# Patient Record
Sex: FEMALE | Race: ASIAN | NOT HISPANIC OR LATINO | Employment: UNEMPLOYED | URBAN - METROPOLITAN AREA
[De-identification: names, ages, dates, MRNs, and addresses within clinical notes are randomized per-mention and may not be internally consistent; named-entity substitution may affect disease eponyms.]

---

## 2020-08-03 ENCOUNTER — LAB (OUTPATIENT)
Dept: LAB | Facility: HOSPITAL | Age: 30
End: 2020-08-03

## 2020-08-03 ENCOUNTER — OFFICE VISIT (OUTPATIENT)
Dept: INTERNAL MEDICINE | Facility: CLINIC | Age: 30
End: 2020-08-03

## 2020-08-03 VITALS
BODY MASS INDEX: 31.02 KG/M2 | SYSTOLIC BLOOD PRESSURE: 108 MMHG | HEART RATE: 73 BPM | HEIGHT: 63 IN | DIASTOLIC BLOOD PRESSURE: 76 MMHG | WEIGHT: 175.1 LBS | OXYGEN SATURATION: 96 % | TEMPERATURE: 97.5 F

## 2020-08-03 DIAGNOSIS — E03.9 ACQUIRED HYPOTHYROIDISM: ICD-10-CM

## 2020-08-03 DIAGNOSIS — E66.09 CLASS 1 OBESITY DUE TO EXCESS CALORIES WITHOUT SERIOUS COMORBIDITY WITH BODY MASS INDEX (BMI) OF 31.0 TO 31.9 IN ADULT: ICD-10-CM

## 2020-08-03 DIAGNOSIS — Z00.00 HEALTHCARE MAINTENANCE: Primary | ICD-10-CM

## 2020-08-03 DIAGNOSIS — D69.6 THROMBOCYTOPENIA (HCC): ICD-10-CM

## 2020-08-03 PROBLEM — E66.811 CLASS 1 OBESITY DUE TO EXCESS CALORIES WITHOUT SERIOUS COMORBIDITY WITH BODY MASS INDEX (BMI) OF 31.0 TO 31.9 IN ADULT: Status: ACTIVE | Noted: 2020-08-03

## 2020-08-03 LAB
ALBUMIN SERPL-MCNC: 4.8 G/DL (ref 3.5–5.2)
ALBUMIN/GLOB SERPL: 2.2 G/DL
ALP SERPL-CCNC: 57 U/L (ref 39–117)
ALT SERPL W P-5'-P-CCNC: 34 U/L (ref 1–33)
ANION GAP SERPL CALCULATED.3IONS-SCNC: 10.7 MMOL/L (ref 5–15)
AST SERPL-CCNC: 22 U/L (ref 1–32)
BILIRUB SERPL-MCNC: 0.5 MG/DL (ref 0–1.2)
BUN SERPL-MCNC: 10 MG/DL (ref 6–20)
BUN/CREAT SERPL: 16.7 (ref 7–25)
CALCIUM SPEC-SCNC: 9.2 MG/DL (ref 8.6–10.5)
CHLORIDE SERPL-SCNC: 101 MMOL/L (ref 98–107)
CO2 SERPL-SCNC: 25.3 MMOL/L (ref 22–29)
CREAT SERPL-MCNC: 0.6 MG/DL (ref 0.57–1)
DEPRECATED RDW RBC AUTO: 42.3 FL (ref 37–54)
ERYTHROCYTE [DISTWIDTH] IN BLOOD BY AUTOMATED COUNT: 13 % (ref 12.3–15.4)
GFR SERPL CREATININE-BSD FRML MDRD: 116 ML/MIN/1.73
GFR SERPL CREATININE-BSD FRML MDRD: 140 ML/MIN/1.73
GLOBULIN UR ELPH-MCNC: 2.2 GM/DL
GLUCOSE SERPL-MCNC: 116 MG/DL (ref 65–99)
HCT VFR BLD AUTO: 40.8 % (ref 34–46.6)
HGB BLD-MCNC: 12.8 G/DL (ref 12–15.9)
LYMPHOCYTES # BLD MANUAL: 1.46 10*3/MM3 (ref 0.7–3.1)
LYMPHOCYTES NFR BLD MANUAL: 29 % (ref 19.6–45.3)
LYMPHOCYTES NFR BLD MANUAL: 5 % (ref 5–12)
MCH RBC QN AUTO: 27.9 PG (ref 26.6–33)
MCHC RBC AUTO-ENTMCNC: 31.4 G/DL (ref 31.5–35.7)
MCV RBC AUTO: 88.9 FL (ref 79–97)
MONOCYTES # BLD AUTO: 0.25 10*3/MM3 (ref 0.1–0.9)
NEUTROPHILS # BLD AUTO: 3.32 10*3/MM3 (ref 1.7–7)
NEUTROPHILS NFR BLD MANUAL: 66 % (ref 42.7–76)
PLAT MORPH BLD: NORMAL
PLATELET # BLD AUTO: 99 10*3/MM3 (ref 140–450)
POTASSIUM SERPL-SCNC: 3.6 MMOL/L (ref 3.5–5.2)
PROT SERPL-MCNC: 7 G/DL (ref 6–8.5)
RBC # BLD AUTO: 4.59 10*6/MM3 (ref 3.77–5.28)
RBC MORPH BLD: NORMAL
SODIUM SERPL-SCNC: 137 MMOL/L (ref 136–145)
T4 FREE SERPL-MCNC: 1.46 NG/DL (ref 0.93–1.7)
TSH SERPL DL<=0.05 MIU/L-ACNC: 2.89 UIU/ML (ref 0.27–4.2)
WBC # BLD AUTO: 5.03 10*3/MM3 (ref 3.4–10.8)
WBC MORPH BLD: NORMAL

## 2020-08-03 PROCEDURE — 85025 COMPLETE CBC W/AUTO DIFF WBC: CPT | Performed by: FAMILY MEDICINE

## 2020-08-03 PROCEDURE — 80053 COMPREHEN METABOLIC PANEL: CPT | Performed by: FAMILY MEDICINE

## 2020-08-03 PROCEDURE — 99385 PREV VISIT NEW AGE 18-39: CPT | Performed by: FAMILY MEDICINE

## 2020-08-03 PROCEDURE — 84439 ASSAY OF FREE THYROXINE: CPT | Performed by: FAMILY MEDICINE

## 2020-08-03 PROCEDURE — 36415 COLL VENOUS BLD VENIPUNCTURE: CPT | Performed by: FAMILY MEDICINE

## 2020-08-03 PROCEDURE — 85007 BL SMEAR W/DIFF WBC COUNT: CPT | Performed by: FAMILY MEDICINE

## 2020-08-03 PROCEDURE — 84443 ASSAY THYROID STIM HORMONE: CPT | Performed by: FAMILY MEDICINE

## 2020-08-03 PROCEDURE — 99214 OFFICE O/P EST MOD 30 MIN: CPT | Performed by: FAMILY MEDICINE

## 2020-08-03 RX ORDER — LEVOTHYROXINE SODIUM 0.05 MG/1
50 TABLET ORAL DAILY
COMMUNITY
End: 2020-08-06 | Stop reason: SDUPTHER

## 2020-08-03 RX ORDER — ACETAMINOPHEN AND CODEINE PHOSPHATE 120; 12 MG/5ML; MG/5ML
0.35 SOLUTION ORAL DAILY
COMMUNITY
Start: 2020-01-14 | End: 2021-01-13

## 2020-08-03 NOTE — PROGRESS NOTES
Subjective   Kathleen Claire is a 30 y.o. female.     Chief Complaint   Patient presents with   • new patient visit   • Hypothyroidism     would like referral to endo         History of Present Illness   Kathleen Claire 30 y.o. female who presents for an Annual Wellness Visit.  she has a history of   Patient Active Problem List   Diagnosis   • Acquired hypothyroidism   • Healthcare maintenance   • Class 1 obesity due to excess calories without serious comorbidity with body mass index (BMI) of 31.0 to 31.9 in adult   .  she has been feeling fairly well.  Labs results discussed in detail with the patient.  Plan to update vaccines if needed today.  I  reviewed health maintenance with her as part of my preventative care plan.    Health Habits:  Dental Exam. up to date  Eye Exam. due  Exercise: 5 times/week.  Current exercise activities include: housecleaning and walking      The following portions of the patient's history were reviewed and updated as appropriate: allergies, current medications, past family history, past medical history, past social history, past surgical history and problem list.    Review of Systems   Constitutional: Negative.    HENT: Positive for sinus pressure.    Eyes: Negative.    Respiratory: Negative.    Cardiovascular: Negative.    Gastrointestinal: Negative.    Genitourinary: Negative.    Musculoskeletal: Negative.    Allergic/Immunologic: Negative.    Neurological: Positive for headaches.   Psychiatric/Behavioral: Negative for confusion and sleep disturbance. The patient is not nervous/anxious.        Objective   Physical Exam   Constitutional: She is oriented to person, place, and time. She appears well-developed and well-nourished.   HENT:   Head: Normocephalic and atraumatic.   Right Ear: External ear normal.   Left Ear: External ear normal.   Eyes: Pupils are equal, round, and reactive to light. EOM are normal. No scleral icterus.   Neck: Normal range of motion. Neck supple. No JVD present.  No thyromegaly present.   Cardiovascular: Normal rate and regular rhythm.   Pulmonary/Chest: Effort normal and breath sounds normal.   Abdominal: There is no tenderness.   Musculoskeletal: She exhibits no edema or tenderness.   Neurological: She is alert and oriented to person, place, and time. She displays normal reflexes. No cranial nerve deficit or sensory deficit. She exhibits normal muscle tone. Coordination normal.   Skin: Skin is warm and dry.   Psychiatric: She has a normal mood and affect. Her behavior is normal. Judgment and thought content normal.   Nursing note and vitals reviewed.      Assessment/Plan   Kathleen was seen today for new patient visit and hypothyroidism.    Diagnoses and all orders for this visit:    Healthcare maintenance  -     CBC & Differential  -     Comprehensive Metabolic Panel    Acquired hypothyroidism  -     TSH  -     T4, Free    Continue attempts at healthy lifestyle with calorie appropriate diet regular physical activity she will obtain Pap smear through gynecology

## 2020-08-03 NOTE — PROGRESS NOTES
Kathleen Claire is a 30 y.o. female.      Assessment/Plan   Problem List Items Addressed This Visit        Digestive    Class 1 obesity due to excess calories without serious comorbidity with body mass index (BMI) of 31.0 to 31.9 in adult       Endocrine    Acquired hypothyroidism    Relevant Medications    levothyroxine (SYNTHROID, LEVOTHROID) 50 MCG tablet    Other Relevant Orders    TSH    T4, Free       Hematopoietic and Hemostatic    Thrombocytopenia (CMS/HCC)    Overview     Peripartum            Other    Healthcare maintenance - Primary    Relevant Orders    CBC & Differential    Comprehensive Metabolic Panel    CBC Auto Differential             Return in about 6 months (around 2/3/2021), or if symptoms worsen or fail to improve, for Recheck, Next scheduled follow up.      Chief Complaint   Patient presents with   • new patient visit   • Hypothyroidism     would like referral to endo     Social History     Tobacco Use   • Smoking status: Never Smoker   • Smokeless tobacco: Never Used   Substance Use Topics   • Alcohol use: Never     Frequency: Never   • Drug use: Never       History of Present Illness   New patient visit for follow-up of chronic problems of hypothyroidism since  she has some headache that is developed over the last few days she also had episode of low platelets with  2019 subsequent no problems no follow-up labs performed.  Concerned that her thyroid necessitates consultation with endocrinologist  Her headache is mostly in the occiput to superior trapezius muscle distribution on the left compared to the right no chest pain or shortness of breath some head congestion no specific allergies this time of year.  She is very been very busy with family in town and has not had follow-up laboratory performed since  has no abdominal complaints or concerns  The following portions of the patient's history were reviewed and updated as appropriate:PMHroutine: Social history ,  "Allergies, Current Medications, Active Problem List and Health Maintenance    Review of Systems   Constitutional: Negative.    HENT: Negative.    Respiratory: Negative.    Cardiovascular: Negative.    Hematological: Negative.        Objective   Vitals:    08/03/20 0913   BP: 108/76   BP Location: Right arm   Patient Position: Sitting   Cuff Size: Large Adult   Pulse: 73   Temp: 97.5 °F (36.4 °C)   TempSrc: Temporal   SpO2: 96%   Weight: 79.4 kg (175 lb 1.6 oz)   Height: 160 cm (63\")     Body mass index is 31.02 kg/m².  Physical Exam   Constitutional: She appears well-developed and well-nourished.   HENT:   Head: Normocephalic and atraumatic.   Right Ear: External ear normal.   Left Ear: External ear normal.   Eyes: EOM are normal. No scleral icterus.   Neck: No thyromegaly present.   Cardiovascular: Normal rate and regular rhythm.   Pulmonary/Chest: Effort normal and breath sounds normal.   Psychiatric: She has a normal mood and affect. Her behavior is normal. Judgment and thought content normal.   Nursing note and vitals reviewed.    Reviewed Data:  No visits with results within 1 Month(s) from this visit.   Latest known visit with results is:   No results found for any previous visit.     "

## 2020-08-05 DIAGNOSIS — D69.6 DECREASED PLATELET COUNT (HCC): Primary | ICD-10-CM

## 2020-08-06 RX ORDER — LEVOTHYROXINE SODIUM 0.05 MG/1
50 TABLET ORAL DAILY
Qty: 90 TABLET | Refills: 2 | Status: SHIPPED | OUTPATIENT
Start: 2020-08-06 | End: 2021-05-25

## 2020-08-27 ENCOUNTER — APPOINTMENT (OUTPATIENT)
Dept: OTHER | Facility: HOSPITAL | Age: 30
End: 2020-08-27

## 2020-09-17 ENCOUNTER — LAB (OUTPATIENT)
Dept: OTHER | Facility: HOSPITAL | Age: 30
End: 2020-09-17

## 2020-09-17 ENCOUNTER — CONSULT (OUTPATIENT)
Dept: ONCOLOGY | Facility: CLINIC | Age: 30
End: 2020-09-17

## 2020-09-17 VITALS
DIASTOLIC BLOOD PRESSURE: 71 MMHG | RESPIRATION RATE: 16 BRPM | HEIGHT: 63 IN | WEIGHT: 174.2 LBS | HEART RATE: 92 BPM | SYSTOLIC BLOOD PRESSURE: 132 MMHG | TEMPERATURE: 97.6 F | BODY MASS INDEX: 30.87 KG/M2 | OXYGEN SATURATION: 100 %

## 2020-09-17 DIAGNOSIS — D69.6 THROMBOCYTOPENIA (HCC): Primary | ICD-10-CM

## 2020-09-17 DIAGNOSIS — D69.6 THROMBOCYTOPENIA (HCC): ICD-10-CM

## 2020-09-17 DIAGNOSIS — R89.9 ABNORMAL LABORATORY TEST: Primary | ICD-10-CM

## 2020-09-17 LAB
ALBUMIN SERPL-MCNC: 4.7 G/DL (ref 3.5–5.2)
ALBUMIN/GLOB SERPL: 1.7 G/DL
ALP SERPL-CCNC: 67 U/L (ref 39–117)
ALT SERPL W P-5'-P-CCNC: 23 U/L (ref 1–33)
ANION GAP SERPL CALCULATED.3IONS-SCNC: 7.6 MMOL/L (ref 5–15)
APTT PPP: 29.3 SECONDS (ref 22.7–35.4)
AST SERPL-CCNC: 20 U/L (ref 1–32)
BASOPHILS # BLD AUTO: 0.04 10*3/MM3 (ref 0–0.2)
BASOPHILS NFR BLD AUTO: 0.7 % (ref 0–1.5)
BILIRUB SERPL-MCNC: 0.3 MG/DL (ref 0–1.2)
BUN SERPL-MCNC: 10 MG/DL (ref 6–20)
BUN/CREAT SERPL: 20 (ref 7–25)
CALCIUM SPEC-SCNC: 9.2 MG/DL (ref 8.6–10.5)
CHLORIDE SERPL-SCNC: 107 MMOL/L (ref 98–107)
CO2 SERPL-SCNC: 25.4 MMOL/L (ref 22–29)
CREAT SERPL-MCNC: 0.5 MG/DL (ref 0.57–1)
DEPRECATED RDW RBC AUTO: 38.3 FL (ref 37–54)
EOSINOPHIL # BLD AUTO: 0.09 10*3/MM3 (ref 0–0.4)
EOSINOPHIL NFR BLD AUTO: 1.5 % (ref 0.3–6.2)
ERYTHROCYTE [DISTWIDTH] IN BLOOD BY AUTOMATED COUNT: 12.6 % (ref 12.3–15.4)
FERRITIN SERPL-MCNC: 39.2 NG/ML (ref 13–150)
FIBRINOGEN PPP-MCNC: 259 MG/DL (ref 219–464)
FOLATE SERPL-MCNC: 13.5 NG/ML (ref 4.78–24.2)
GFR SERPL CREATININE-BSD FRML MDRD: 143 ML/MIN/1.73
GFR SERPL CREATININE-BSD FRML MDRD: >150 ML/MIN/1.73
GLOBULIN UR ELPH-MCNC: 2.7 GM/DL
GLUCOSE SERPL-MCNC: 111 MG/DL (ref 65–99)
HAV IGM SERPL QL IA: NORMAL
HBV CORE IGM SERPL QL IA: NORMAL
HBV SURFACE AG SERPL QL IA: NORMAL
HCT VFR BLD AUTO: 39 % (ref 34–46.6)
HCV AB SER DONR QL: NORMAL
HGB BLD-MCNC: 13.1 G/DL (ref 12–15.9)
IMM GRANULOCYTES # BLD AUTO: 0.03 10*3/MM3 (ref 0–0.05)
IMM GRANULOCYTES NFR BLD AUTO: 0.5 % (ref 0–0.5)
INR PPP: 0.95 (ref 0.9–1.1)
IRON 24H UR-MRATE: 77 MCG/DL (ref 37–145)
IRON SATN MFR SERPL: 19 % (ref 20–50)
LYMPHOCYTES # BLD AUTO: 2.29 10*3/MM3 (ref 0.7–3.1)
LYMPHOCYTES NFR BLD AUTO: 37.6 % (ref 19.6–45.3)
MCH RBC QN AUTO: 28.6 PG (ref 26.6–33)
MCHC RBC AUTO-ENTMCNC: 33.6 G/DL (ref 31.5–35.7)
MCV RBC AUTO: 85.2 FL (ref 79–97)
MONOCYTES # BLD AUTO: 0.37 10*3/MM3 (ref 0.1–0.9)
MONOCYTES NFR BLD AUTO: 6.1 % (ref 5–12)
NEUTROPHILS NFR BLD AUTO: 3.27 10*3/MM3 (ref 1.7–7)
NEUTROPHILS NFR BLD AUTO: 53.6 % (ref 42.7–76)
NRBC BLD AUTO-RTO: 0 /100 WBC (ref 0–0.2)
PLATELET # BLD AUTO: 115 10*3/MM3 (ref 140–450)
PLATELET # BLD AUTO: 95 10*3/MM3 (ref 140–450)
PLATELETS.RETICULATED NFR BLD AUTO: 28.9 % (ref 0.9–6.5)
PMV BLD AUTO: 14.6 FL (ref 6–12)
POTASSIUM SERPL-SCNC: 3.7 MMOL/L (ref 3.5–5.2)
PROT SERPL-MCNC: 7.4 G/DL (ref 6–8.5)
PROTHROMBIN TIME: 12.6 SECONDS (ref 11.7–14.2)
RBC # BLD AUTO: 4.58 10*6/MM3 (ref 3.77–5.28)
SODIUM SERPL-SCNC: 140 MMOL/L (ref 136–145)
TIBC SERPL-MCNC: 416 MCG/DL (ref 298–536)
TRANSFERRIN SERPL-MCNC: 279 MG/DL (ref 200–360)
VIT B12 BLD-MCNC: 217 PG/ML (ref 211–946)
WBC # BLD AUTO: 6.09 10*3/MM3 (ref 3.4–10.8)

## 2020-09-17 PROCEDURE — 80074 ACUTE HEPATITIS PANEL: CPT | Performed by: INTERNAL MEDICINE

## 2020-09-17 PROCEDURE — 83540 ASSAY OF IRON: CPT | Performed by: INTERNAL MEDICINE

## 2020-09-17 PROCEDURE — 85055 RETICULATED PLATELET ASSAY: CPT | Performed by: INTERNAL MEDICINE

## 2020-09-17 PROCEDURE — 99204 OFFICE O/P NEW MOD 45 MIN: CPT | Performed by: INTERNAL MEDICINE

## 2020-09-17 PROCEDURE — 80053 COMPREHEN METABOLIC PANEL: CPT | Performed by: INTERNAL MEDICINE

## 2020-09-17 PROCEDURE — 36415 COLL VENOUS BLD VENIPUNCTURE: CPT

## 2020-09-17 PROCEDURE — 85610 PROTHROMBIN TIME: CPT | Performed by: INTERNAL MEDICINE

## 2020-09-17 PROCEDURE — 84466 ASSAY OF TRANSFERRIN: CPT | Performed by: INTERNAL MEDICINE

## 2020-09-17 PROCEDURE — 87389 HIV-1 AG W/HIV-1&-2 AB AG IA: CPT | Performed by: INTERNAL MEDICINE

## 2020-09-17 PROCEDURE — 85384 FIBRINOGEN ACTIVITY: CPT | Performed by: INTERNAL MEDICINE

## 2020-09-17 PROCEDURE — 85025 COMPLETE CBC W/AUTO DIFF WBC: CPT | Performed by: INTERNAL MEDICINE

## 2020-09-17 PROCEDURE — 82728 ASSAY OF FERRITIN: CPT | Performed by: INTERNAL MEDICINE

## 2020-09-17 PROCEDURE — 82746 ASSAY OF FOLIC ACID SERUM: CPT | Performed by: INTERNAL MEDICINE

## 2020-09-17 PROCEDURE — 82607 VITAMIN B-12: CPT | Performed by: INTERNAL MEDICINE

## 2020-09-17 PROCEDURE — 85730 THROMBOPLASTIN TIME PARTIAL: CPT | Performed by: INTERNAL MEDICINE

## 2020-09-17 PROCEDURE — 86703 HIV-1/HIV-2 1 RESULT ANTBDY: CPT | Performed by: INTERNAL MEDICINE

## 2020-09-17 NOTE — PROGRESS NOTES
Subjective   Kathleen Claire is a 30 y.o. female.  Referred by  for evaluation of thrombocytopenia.      History of Present Illness    is a 30-year-old premenopausal  lady with longstanding history of thrombocytopenia.  She was diagnosed with thrombocytopenia prior to having her first child about 8 years ago.  She reports being placed on steroids recently around the time of delivery of her second child about 9 months ago.  At that point she was evaluated by Dr. Nehemias Carmen at [facility] who recommended steroids to help increase the platelet count.  100,000 to facilitate epidural.  She moved to [place] about a year ago from [place] where she reportedly had seen a hematologist and was diagnosed with ITP.  She denies being on any medications chronically except for levothyroxine, denies any new herbal medications or herbal teas.  On review of the labs she does have B12 deficiency with a B12 level at 100.  She has been on oral B12 replacement however she is has not taken that since she got pregnant with her second child.  She has never been on parenteral B12 replacement.  She denies any excessive bleeding or bruising.  Denies any risk factors for HIV or hepatitis B.  Denies any history of alcohol use.  She reports a well-rounded diet.   Review of labs over the past year showed platelet counts ranging from ,000.Iron studies performed 2019 suggestive of iron deficiency with ferritin of 7 and iron saturation 19.  She reports excessive fatigue.  Denies any recent weight changes, no fevers, no chills, no new lymphadenopathy.      The following portions of the patient's history were reviewed and updated as appropriate: allergies, current medications, past family history, past medical history, past social history, past surgical history and problem list.    History reviewed. No pertinent past medical history.     Past Surgical History:   Procedure Laterality Date   •  SECTION           History reviewed. No pertinent family history.     Social History     Socioeconomic History   • Marital status:      Spouse name: Not on file   • Number of children: Not on file   • Years of education: Not on file   • Highest education level: Not on file   Tobacco Use   • Smoking status: Never Smoker   • Smokeless tobacco: Never Used   Substance and Sexual Activity   • Alcohol use: Never     Frequency: Never   • Drug use: Never   • Sexual activity: Yes        OB History    No obstetric history on file.          No Known Allergies         Review of Systems   Constitutional: Positive for fatigue. Negative for activity change, appetite change, chills, diaphoresis, fever, unexpected weight gain and unexpected weight loss.   HENT: Negative for congestion, dental problem, drooling, ear discharge, ear pain, facial swelling, hearing loss, mouth sores, nosebleeds, postnasal drip, rhinorrhea, sinus pressure, sneezing, sore throat, swollen glands, tinnitus, trouble swallowing and voice change.    Eyes: Negative for blurred vision, double vision, photophobia, pain, discharge, redness, itching and visual disturbance.   Respiratory: Negative for apnea, cough, choking, chest tightness, shortness of breath, wheezing and stridor.    Cardiovascular: Negative for chest pain, palpitations and leg swelling.   Gastrointestinal: Negative for abdominal distention, abdominal pain, anal bleeding, blood in stool, constipation, diarrhea, nausea, rectal pain, vomiting, GERD and indigestion.   Endocrine: Negative for cold intolerance, heat intolerance, polydipsia, polyphagia and polyuria.   Genitourinary: Negative for amenorrhea, breast discharge, breast lump, breast pain, decreased libido, decreased urine volume, difficulty urinating, dyspareunia, dysuria, flank pain, frequency, genital sores, hematuria, menstrual problem, pelvic pain, pelvic pressure, urgency, urinary incontinence, vaginal bleeding, vaginal discharge and vaginal  "pain.   Musculoskeletal: Negative for arthralgias, back pain, gait problem, joint swelling, myalgias, neck pain, neck stiffness and bursitis.   Skin: Negative for color change, dry skin, pallor, rash, skin lesions and bruise.   Allergic/Immunologic: Negative for environmental allergies, food allergies and immunocompromised state.   Neurological: Negative for dizziness, tremors, seizures, syncope, facial asymmetry, speech difficulty, weakness, light-headedness, numbness, headache, memory problem and confusion.   Hematological: Negative for adenopathy. Does not bruise/bleed easily.   Psychiatric/Behavioral: Negative for agitation, behavioral problems, decreased concentration, dysphoric mood, hallucinations, self-injury, sleep disturbance, suicidal ideas, negative for hyperactivity, depressed mood and stress. The patient is not nervous/anxious.          Objective   Blood pressure 132/71, pulse 92, temperature 97.6 °F (36.4 °C), temperature source Skin, resp. rate 16, height 160 cm (62.99\"), weight 79 kg (174 lb 3.2 oz), SpO2 100 %, not currently breastfeeding.   Physical Exam  Constitutional:       Appearance: Normal appearance. She is normal weight.   HENT:      Head: Normocephalic.      Right Ear: External ear normal.      Left Ear: External ear normal.      Nose: Nose normal. No congestion or rhinorrhea.      Mouth/Throat:      Mouth: Mucous membranes are moist.      Pharynx: Oropharynx is clear. No oropharyngeal exudate or posterior oropharyngeal erythema.   Eyes:      General: No scleral icterus.        Right eye: No discharge.         Left eye: No discharge.      Extraocular Movements: Extraocular movements intact.      Conjunctiva/sclera: Conjunctivae normal.      Pupils: Pupils are equal, round, and reactive to light.   Neck:      Musculoskeletal: Normal range of motion. No neck rigidity or muscular tenderness.      Vascular: No carotid bruit.   Cardiovascular:      Rate and Rhythm: Normal rate and regular " rhythm.      Heart sounds: No murmur. No gallop.    Pulmonary:      Effort: Pulmonary effort is normal. No respiratory distress.      Breath sounds: No stridor. No wheezing, rhonchi or rales.   Chest:      Chest wall: No tenderness.   Abdominal:      General: Abdomen is flat. Bowel sounds are normal. There is no distension.      Palpations: Abdomen is soft. There is no mass.      Tenderness: There is no abdominal tenderness. There is no guarding or rebound.      Hernia: No hernia is present.   Musculoskeletal: Normal range of motion.         General: No swelling, tenderness, deformity or signs of injury.      Right lower leg: No edema.      Left lower leg: No edema.   Lymphadenopathy:      Cervical: No cervical adenopathy.   Skin:     General: Skin is warm and dry.      Coloration: Skin is not jaundiced or pale.      Findings: No bruising, erythema, lesion or rash.   Neurological:      General: No focal deficit present.      Mental Status: She is alert. Mental status is at baseline. She is disoriented.      Cranial Nerves: No cranial nerve deficit.      Sensory: No sensory deficit.      Motor: No weakness.      Coordination: Coordination normal.      Gait: Gait normal.      Deep Tendon Reflexes: Reflexes normal.   Psychiatric:         Mood and Affect: Mood normal.         Behavior: Behavior normal.         Thought Content: Thought content normal.         Judgment: Judgment normal.           Appointment on 09/17/2020   Component Date Value Ref Range Status   • WBC 09/17/2020 6.09  3.40 - 10.80 10*3/mm3 Final   • RBC 09/17/2020 4.58  3.77 - 5.28 10*6/mm3 Final   • Hemoglobin 09/17/2020 13.1  12.0 - 15.9 g/dL Final   • Hematocrit 09/17/2020 39.0  34.0 - 46.6 % Final   • MCV 09/17/2020 85.2  79.0 - 97.0 fL Final   • MCH 09/17/2020 28.6  26.6 - 33.0 pg Final   • MCHC 09/17/2020 33.6  31.5 - 35.7 g/dL Final   • RDW 09/17/2020 12.6  12.3 - 15.4 % Final   • RDW-SD 09/17/2020 38.3  37.0 - 54.0 fl Final   • MPV 09/17/2020  14.6* 6.0 - 12.0 fL Final   • Platelets 09/17/2020 95* 140 - 450 10*3/mm3 Final   • Neutrophil % 09/17/2020 53.6  42.7 - 76.0 % Final   • Lymphocyte % 09/17/2020 37.6  19.6 - 45.3 % Final   • Monocyte % 09/17/2020 6.1  5.0 - 12.0 % Final   • Eosinophil % 09/17/2020 1.5  0.3 - 6.2 % Final   • Basophil % 09/17/2020 0.7  0.0 - 1.5 % Final   • Immature Grans % 09/17/2020 0.5  0.0 - 0.5 % Final   • Neutrophils, Absolute 09/17/2020 3.27  1.70 - 7.00 10*3/mm3 Final   • Lymphocytes, Absolute 09/17/2020 2.29  0.70 - 3.10 10*3/mm3 Final   • Monocytes, Absolute 09/17/2020 0.37  0.10 - 0.90 10*3/mm3 Final   • Eosinophils, Absolute 09/17/2020 0.09  0.00 - 0.40 10*3/mm3 Final   • Basophils, Absolute 09/17/2020 0.04  0.00 - 0.20 10*3/mm3 Final   • Immature Grans, Absolute 09/17/2020 0.03  0.00 - 0.05 10*3/mm3 Final   • nRBC 09/17/2020 0.0  0.0 - 0.2 /100 WBC Final        No radiology results for the last 30 days.     Labs over the past 1 year reviewed     Peripheral smear reviewed and noted to have somewhat decreased platelets and normal RBC and WBC morphology.       Assessment/Plan   Kathleen was seen today for appointment.    Diagnoses and all orders for this visit:    Thrombocytopenia (CMS/HCC)  -     Folate  -     Vitamin B12  -     Ferritin  -     Iron Profile  -     Immature Platelet Fraction  -     aPTT  -     Protime-INR  -     Fibrinogen; Future  -     Comprehensive Metabolic Panel  -     HIV 1 / 2 Antibodies  -     Hepatitis Panel, Acute; Future  -     US Spleen; Future       is a 30 yr old premenopausal lady with long standing thrombocytopenia here for evaluation of the thrombocytoepnia    1.Thrombocytopenia  · B12 noted to be low in the past   · Will check vitamin B12 level today.  · Iron studies noted to be abnormal in the past.  Repeat today.  · Check HIV and hepatitis  · Most likely ITP given the chronicity and stability of thrombocytopenia.  · Also she reports that at the time of pregnancy there was an  improvement in her platelet count with low-dose Dex.  · Obtain ultrasound of the spleen    2.  B12 deficiency  · Recheck B12 level today  · Peripheral start parenteral replacement    3.iron deficiency  · Hemoglobin normal today  · Recheck iron studies  · If low plan to start on iron replacement    4.fatigue  · TSH level recently checked and normal  · Could be secondary to B12 deficiency  · Follow-up,    5.hypothyroidism  · Continue levothyroxine    6.follow-up-2 to 3 weeks for video visit to discuss the labs and further management

## 2020-09-18 LAB — HOLD SPECIMEN: NORMAL

## 2020-09-19 LAB — HIV 1+2 AB+HIV1 P24 AG SERPL QL IA: NON REACTIVE

## 2020-09-25 ENCOUNTER — HOSPITAL ENCOUNTER (OUTPATIENT)
Dept: ULTRASOUND IMAGING | Facility: HOSPITAL | Age: 30
Discharge: HOME | End: 2020-09-25
Admitting: INTERNAL MEDICINE

## 2020-09-25 DIAGNOSIS — D69.6 THROMBOCYTOPENIA (HCC): ICD-10-CM

## 2020-09-25 PROCEDURE — 76705 ECHO EXAM OF ABDOMEN: CPT

## 2020-09-28 ENCOUNTER — TELEPHONE (OUTPATIENT)
Dept: ONCOLOGY | Facility: CLINIC | Age: 30
End: 2020-09-28

## 2020-09-28 NOTE — TELEPHONE ENCOUNTER
Contacted pt by phone to notify her of normal spleen ultrasound per inbox request of Dr Valencia- pt v/u.

## 2020-10-07 ENCOUNTER — TELEPHONE (OUTPATIENT)
Dept: ONCOLOGY | Facility: CLINIC | Age: 30
End: 2020-10-07

## 2020-10-07 NOTE — TELEPHONE ENCOUNTER
Pt states that she does not have a social security number and does not know how to do the video visit without it.  I reviewed with Willi , our . Willi switched it to a televisit.  Called pt and let her know this.  She v/u.

## 2020-10-07 NOTE — TELEPHONE ENCOUNTER
Patient has a video visit with Dr. Valencia yesterday.  She called the number for My Chart and they could not help her get an account because she doesn't have a social security number.  They told her she would have to contact the office.    [phone]

## 2020-10-08 ENCOUNTER — OFFICE VISIT (OUTPATIENT)
Dept: ONCOLOGY | Facility: CLINIC | Age: 30
End: 2020-10-08

## 2020-10-08 DIAGNOSIS — D69.6 THROMBOCYTOPENIA (HCC): Primary | ICD-10-CM

## 2020-10-08 PROCEDURE — 99214 OFFICE O/P EST MOD 30 MIN: CPT | Performed by: INTERNAL MEDICINE

## 2020-10-08 RX ORDER — FERROUS SULFATE 325(65) MG
325 TABLET ORAL EVERY OTHER DAY
Qty: 15 TABLET | Refills: 5 | Status: SHIPPED | OUTPATIENT
Start: 2020-10-08 | End: 2020-11-07

## 2020-10-08 RX ORDER — LANOLIN ALCOHOL/MO/W.PET/CERES
1000 CREAM (GRAM) TOPICAL DAILY
Qty: 30 TABLET | Refills: 5 | Status: SHIPPED | OUTPATIENT
Start: 2020-10-08 | End: 2022-04-14 | Stop reason: SDUPTHER

## 2020-10-08 NOTE — PROGRESS NOTES
Subjective   Kathleen Claire is a 30 y.o. female.  Referred by  for evaluation of thrombocytopenia.      History of Present Illness    is a 30-year-old premenopausal  lady with longstanding history of thrombocytopenia.  She was diagnosed with thrombocytopenia prior to having her first child about 8 years ago.  She reports being placed on steroids recently around the time of delivery of her second child about 9 months ago.  At that point she was evaluated by Dr. Nehemias Carmen at [facility] who recommended steroids to help increase the platelet count.  100,000 to facilitate epidural.  She moved to [place] about a year ago from [place] where she reportedly had seen a hematologist and was diagnosed with ITP.  She denies being on any medications chronically except for levothyroxine, denies any new herbal medications or herbal teas.  On review of the labs she does have B12 deficiency with a B12 level at 100.  She has been on oral B12 replacement however she is has not taken that since she got pregnant with her second child.  She has never been on parenteral B12 replacement.  She denies any excessive bleeding or bruising.  Denies any risk factors for HIV or hepatitis B.  Denies any history of alcohol use.  She reports a well-rounded diet.   Review of labs over the past year showed platelet counts ranging from ,000.Iron studies performed November 2019 suggestive of iron deficiency with ferritin of 7 and iron saturation 19.  She reports excessive fatigue.  Denies any recent weight changes, no fevers, no chills, no new lymphadenopathy.    Interval history  Ms. Claire presents today for telephone visit to discuss the labs and further treatment recommendations.  She continues to have fatigue.  Denies any excessive bleeding or bruising.  She denies any other complaints at this time.      The following portions of the patient's history were reviewed and updated as appropriate: allergies, current  medications, past family history, past medical history, past social history, past surgical history and problem list.    No past medical history on file.     Past Surgical History:   Procedure Laterality Date   •  SECTION          No family history on file.     Social History     Socioeconomic History   • Marital status:      Spouse name: Not on file   • Number of children: Not on file   • Years of education: Not on file   • Highest education level: Not on file   Tobacco Use   • Smoking status: Never Smoker   • Smokeless tobacco: Never Used   Substance and Sexual Activity   • Alcohol use: Never     Frequency: Never   • Drug use: Never   • Sexual activity: Yes        OB History    No obstetric history on file.          No Known Allergies         Review of Systems   Constitutional: Positive for fatigue. Negative for activity change, appetite change, chills, diaphoresis, fever, unexpected weight gain and unexpected weight loss.   HENT: Negative for congestion, dental problem, drooling, ear discharge, ear pain, facial swelling, hearing loss, mouth sores, nosebleeds, postnasal drip, rhinorrhea, sinus pressure, sneezing, sore throat, swollen glands, tinnitus, trouble swallowing and voice change.    Eyes: Negative for blurred vision, double vision, photophobia, pain, discharge, redness, itching and visual disturbance.   Respiratory: Negative for apnea, cough, choking, chest tightness, shortness of breath, wheezing and stridor.    Cardiovascular: Negative for chest pain, palpitations and leg swelling.   Gastrointestinal: Negative for abdominal distention, abdominal pain, anal bleeding, blood in stool, constipation, diarrhea, nausea, rectal pain, vomiting, GERD and indigestion.   Endocrine: Negative for cold intolerance, heat intolerance, polydipsia, polyphagia and polyuria.   Genitourinary: Negative for amenorrhea, breast discharge, breast lump, breast pain, decreased libido, decreased urine volume, difficulty  urinating, dyspareunia, dysuria, flank pain, frequency, genital sores, hematuria, menstrual problem, pelvic pain, pelvic pressure, urgency, urinary incontinence, vaginal bleeding, vaginal discharge and vaginal pain.   Musculoskeletal: Negative for arthralgias, back pain, gait problem, joint swelling, myalgias, neck pain, neck stiffness and bursitis.   Skin: Negative for color change, dry skin, pallor, rash, skin lesions and bruise.   Allergic/Immunologic: Negative for environmental allergies, food allergies and immunocompromised state.   Neurological: Negative for dizziness, tremors, seizures, syncope, facial asymmetry, speech difficulty, weakness, light-headedness, numbness, headache, memory problem and confusion.   Hematological: Negative for adenopathy. Does not bruise/bleed easily.   Psychiatric/Behavioral: Negative for agitation, behavioral problems, decreased concentration, dysphoric mood, hallucinations, self-injury, sleep disturbance, suicidal ideas, negative for hyperactivity, depressed mood and stress. The patient is not nervous/anxious.      Review of systems unchanged    Objective   not currently breastfeeding.   Physical Exam  Constitutional:       Appearance: Normal appearance. She is normal weight.   HENT:      Head: Normocephalic.      Right Ear: External ear normal.      Left Ear: External ear normal.      Nose: Nose normal. No congestion or rhinorrhea.      Mouth/Throat:      Mouth: Mucous membranes are moist.      Pharynx: Oropharynx is clear. No oropharyngeal exudate or posterior oropharyngeal erythema.   Eyes:      General: No scleral icterus.        Right eye: No discharge.         Left eye: No discharge.      Extraocular Movements: Extraocular movements intact.      Conjunctiva/sclera: Conjunctivae normal.      Pupils: Pupils are equal, round, and reactive to light.   Neck:      Musculoskeletal: Normal range of motion. No neck rigidity or muscular tenderness.      Vascular: No carotid bruit.    Cardiovascular:      Rate and Rhythm: Normal rate and regular rhythm.      Heart sounds: No murmur. No gallop.    Pulmonary:      Effort: Pulmonary effort is normal. No respiratory distress.      Breath sounds: No stridor. No wheezing, rhonchi or rales.   Chest:      Chest wall: No tenderness.   Abdominal:      General: Abdomen is flat. Bowel sounds are normal. There is no distension.      Palpations: Abdomen is soft. There is no mass.      Tenderness: There is no abdominal tenderness. There is no guarding or rebound.      Hernia: No hernia is present.   Musculoskeletal: Normal range of motion.         General: No swelling, tenderness, deformity or signs of injury.      Right lower leg: No edema.      Left lower leg: No edema.   Lymphadenopathy:      Cervical: No cervical adenopathy.   Skin:     General: Skin is warm and dry.      Coloration: Skin is not jaundiced or pale.      Findings: No bruising, erythema, lesion or rash.   Neurological:      General: No focal deficit present.      Mental Status: She is alert. Mental status is at baseline. She is disoriented.      Cranial Nerves: No cranial nerve deficit.      Sensory: No sensory deficit.      Motor: No weakness.      Coordination: Coordination normal.      Gait: Gait normal.      Deep Tendon Reflexes: Reflexes normal.   Psychiatric:         Mood and Affect: Mood normal.         Behavior: Behavior normal.         Thought Content: Thought content normal.         Judgment: Judgment normal.       Physical exam not possible today due to this being a telephone visit.    Consult on 09/17/2020   Component Date Value Ref Range Status   • Folate 09/17/2020 13.50  4.78 - 24.20 ng/mL Final   • Vitamin B-12 09/17/2020 217  211 - 946 pg/mL Final   • Ferritin 09/17/2020 39.20  13.00 - 150.00 ng/mL Final   • Iron 09/17/2020 77  37 - 145 mcg/dL Final   • Iron Saturation 09/17/2020 19* 20 - 50 % Final   • Transferrin 09/17/2020 279  200 - 360 mg/dL Final   • TIBC 09/17/2020  416  298 - 536 mcg/dL Final   • IPF 09/17/2020 28.90* 0.90 - 6.50 % Final   • Platelets 09/17/2020 115* 140 - 450 10*3/mm3 Final   • PTT 09/17/2020 29.3  22.7 - 35.4 seconds Final   • Protime 09/17/2020 12.6  11.7 - 14.2 Seconds Final   • INR 09/17/2020 0.95  0.90 - 1.10 Final   • Glucose 09/17/2020 111* 65 - 99 mg/dL Final   • BUN 09/17/2020 10  6 - 20 mg/dL Final   • Creatinine 09/17/2020 0.50* 0.57 - 1.00 mg/dL Final   • Sodium 09/17/2020 140  136 - 145 mmol/L Final   • Potassium 09/17/2020 3.7  3.5 - 5.2 mmol/L Final   • Chloride 09/17/2020 107  98 - 107 mmol/L Final   • CO2 09/17/2020 25.4  22.0 - 29.0 mmol/L Final   • Calcium 09/17/2020 9.2  8.6 - 10.5 mg/dL Final   • Total Protein 09/17/2020 7.4  6.0 - 8.5 g/dL Final   • Albumin 09/17/2020 4.70  3.50 - 5.20 g/dL Final   • ALT (SGPT) 09/17/2020 23  1 - 33 U/L Final   • AST (SGOT) 09/17/2020 20  1 - 32 U/L Final   • Alkaline Phosphatase 09/17/2020 67  39 - 117 U/L Final   • Total Bilirubin 09/17/2020 0.3  0.0 - 1.2 mg/dL Final   • eGFR Non African Amer 09/17/2020 143  >60 mL/min/1.73 Final   • eGFR   Amer 09/17/2020 >150  >60 mL/min/1.73 Final   • Globulin 09/17/2020 2.7  gm/dL Final   • A/G Ratio 09/17/2020 1.7  g/dL Final   • BUN/Creatinine Ratio 09/17/2020 20.0  7.0 - 25.0 Final   • Anion Gap 09/17/2020 7.6  5.0 - 15.0 mmol/L Final   • HIV Screen 4th Gen w/RFX (Referenc* 09/17/2020 Non Reactive  Non Reactive Final   Lab on 09/17/2020   Component Date Value Ref Range Status   • WBC 09/17/2020 6.09  3.40 - 10.80 10*3/mm3 Final   • RBC 09/17/2020 4.58  3.77 - 5.28 10*6/mm3 Final   • Hemoglobin 09/17/2020 13.1  12.0 - 15.9 g/dL Final   • Hematocrit 09/17/2020 39.0  34.0 - 46.6 % Final   • MCV 09/17/2020 85.2  79.0 - 97.0 fL Final   • MCH 09/17/2020 28.6  26.6 - 33.0 pg Final   • MCHC 09/17/2020 33.6  31.5 - 35.7 g/dL Final   • RDW 09/17/2020 12.6  12.3 - 15.4 % Final   • RDW-SD 09/17/2020 38.3  37.0 - 54.0 fl Final   • MPV 09/17/2020 14.6* 6.0 - 12.0 fL  Final   • Platelets 09/17/2020 95* 140 - 450 10*3/mm3 Final   • Neutrophil % 09/17/2020 53.6  42.7 - 76.0 % Final   • Lymphocyte % 09/17/2020 37.6  19.6 - 45.3 % Final   • Monocyte % 09/17/2020 6.1  5.0 - 12.0 % Final   • Eosinophil % 09/17/2020 1.5  0.3 - 6.2 % Final   • Basophil % 09/17/2020 0.7  0.0 - 1.5 % Final   • Immature Grans % 09/17/2020 0.5  0.0 - 0.5 % Final   • Neutrophils, Absolute 09/17/2020 3.27  1.70 - 7.00 10*3/mm3 Final   • Lymphocytes, Absolute 09/17/2020 2.29  0.70 - 3.10 10*3/mm3 Final   • Monocytes, Absolute 09/17/2020 0.37  0.10 - 0.90 10*3/mm3 Final   • Eosinophils, Absolute 09/17/2020 0.09  0.00 - 0.40 10*3/mm3 Final   • Basophils, Absolute 09/17/2020 0.04  0.00 - 0.20 10*3/mm3 Final   • Immature Grans, Absolute 09/17/2020 0.03  0.00 - 0.05 10*3/mm3 Final   • nRBC 09/17/2020 0.0  0.0 - 0.2 /100 WBC Final   • Fibrinogen 09/17/2020 259  219 - 464 mg/dL Final   • Hepatitis B Surface Ag 09/17/2020 Non-Reactive  Non-Reactive Final   • Hep A IgM 09/17/2020 Non-Reactive  Non-Reactive Final   • Hep B C IgM 09/17/2020 Non-Reactive  Non-Reactive Final   • Hepatitis C Ab 09/17/2020 Non-Reactive  Non-Reactive Final   • Extra Tube 09/17/2020 Hold for add-ons.   Final    Auto resulted.        No radiology results for the last 30 days.     9/17/2020-labs reviewed and summarized below  HIV negative  Hepatitis panel negative  Fibrinogen normal at 259  CMP shows a mildly elevated blood sugar at home 11, creatinine 0.5, BUN 10, LFTs normal  INR normal at 0.95  PTT normal at 29.3  Immature platelet fraction elevated at 28.9, platelet count 150  Iron profile shows a mildly low iron saturation of 19%  Ferritin low at 39.2  Vitamin B12 low at 217  Folic acid normal at 13.5      Assessment/Plan   There are no diagnoses linked to this encounter.   is a 30 yr old premenopausal lady with long standing thrombocytopenia here for evaluation of the thrombocytoepnia    1.Thrombocytopenia  · Has diagnosis of  ITP  · Immature platelet fraction is elevated supportive of this diagnosis  · However B12 count is also noted to be low at 217  · Recommend taking oral B12 thousand micrograms daily.  · We will recheck B12 level in 6 months from now if it continues to be low then she would need subcu replacement  · Vitamin B12 deficiency could also be contributing to the thrombocytopenia.  · Discussed the transit time with the patient and recommend taking B12 and monitoring platelet counts periodically.    2.  B12 deficiency  · B12 count low at 217  · Vitamin B12 prescribed today  · Recheck on follow-up  · If continues to be low then plan for parenteral replacement    3.iron deficiency  · Hemoglobin normal  · Pertinent iron saturation mildly low suggestive of iron deficiency  · Start ferrous sulfate 325 mg every other day  · Scription sent to pharmacy    4.fatigue  · TSH level recently checked and normal  · Change  · Secondary to B12 deficiency  · vitamin B12 replacement    5.hypothyroidism  · Continue levothyroxine    6.follow-up-6 months with CBC, CMP, iron studies, vitamin B12    You have chosen to receive care through a telephone visit. Do you consent to use a telephone visit for your medical care today? Yes

## 2020-11-16 ENCOUNTER — TELEPHONE (OUTPATIENT)
Dept: INTERNAL MEDICINE | Facility: CLINIC | Age: 30
End: 2020-11-16

## 2020-11-16 NOTE — TELEPHONE ENCOUNTER
PATIENT CALLED AND STATED THAT THEY NEED A LETTER STATING THAT . THE PATIENT HAD A BAROMETRIC SCREENING DONE 09/25/20. THE NEED THE PAPERWORK FOR THE INSURANCE. PLEASE SEND TO EMAIL  [email or web address]    PLEASE ADVISE     CALL BACK IS  [phone]

## 2020-11-23 NOTE — TELEPHONE ENCOUNTER
Pts  is asking that this letter be mailed to [address] [place] [address] [address]  [address]  [facility] [address]

## 2020-12-11 ENCOUNTER — TELEPHONE (OUTPATIENT)
Dept: INTERNAL MEDICINE | Facility: CLINIC | Age: 30
End: 2020-12-11

## 2020-12-11 NOTE — TELEPHONE ENCOUNTER
Kiana Claire(spouse) calling requesting letter stating the patient had a barometric screening on 9/25/20 to be faxed to [phone]

## 2020-12-14 NOTE — TELEPHONE ENCOUNTER
Spoke to patient and .  He stated that he needs a letter stating that patient had a physical.  Please advise.

## 2021-04-15 ENCOUNTER — LAB (OUTPATIENT)
Dept: OTHER | Facility: HOSPITAL | Age: 31
End: 2021-04-15

## 2021-04-15 ENCOUNTER — OFFICE VISIT (OUTPATIENT)
Dept: ONCOLOGY | Facility: CLINIC | Age: 31
End: 2021-04-15

## 2021-04-15 ENCOUNTER — TELEPHONE (OUTPATIENT)
Dept: ONCOLOGY | Facility: CLINIC | Age: 31
End: 2021-04-15

## 2021-04-15 VITALS
HEART RATE: 69 BPM | RESPIRATION RATE: 20 BRPM | HEIGHT: 63 IN | DIASTOLIC BLOOD PRESSURE: 72 MMHG | TEMPERATURE: 97.7 F | OXYGEN SATURATION: 99 % | WEIGHT: 165.1 LBS | BODY MASS INDEX: 29.25 KG/M2 | SYSTOLIC BLOOD PRESSURE: 110 MMHG

## 2021-04-15 DIAGNOSIS — D69.6 THROMBOCYTOPENIA (HCC): ICD-10-CM

## 2021-04-15 DIAGNOSIS — E53.8 B12 DEFICIENCY: ICD-10-CM

## 2021-04-15 DIAGNOSIS — D69.6 THROMBOCYTOPENIA (HCC): Primary | ICD-10-CM

## 2021-04-15 LAB
ALBUMIN SERPL-MCNC: 4.5 G/DL (ref 3.5–5.2)
ALBUMIN/GLOB SERPL: 1.6 G/DL
ALP SERPL-CCNC: 69 U/L (ref 39–117)
ALT SERPL W P-5'-P-CCNC: 12 U/L (ref 1–33)
ANION GAP SERPL CALCULATED.3IONS-SCNC: 10.3 MMOL/L (ref 5–15)
AST SERPL-CCNC: 15 U/L (ref 1–32)
BASOPHILS # BLD AUTO: 0.02 10*3/MM3 (ref 0–0.2)
BASOPHILS NFR BLD AUTO: 0.3 % (ref 0–1.5)
BILIRUB SERPL-MCNC: 0.5 MG/DL (ref 0–1.2)
BUN SERPL-MCNC: 10 MG/DL (ref 6–20)
BUN/CREAT SERPL: 17.9 (ref 7–25)
CALCIUM SPEC-SCNC: 9.2 MG/DL (ref 8.6–10.5)
CHLORIDE SERPL-SCNC: 103 MMOL/L (ref 98–107)
CO2 SERPL-SCNC: 26.7 MMOL/L (ref 22–29)
CREAT SERPL-MCNC: 0.56 MG/DL (ref 0.57–1)
DEPRECATED RDW RBC AUTO: 42 FL (ref 37–54)
EOSINOPHIL # BLD AUTO: 0.09 10*3/MM3 (ref 0–0.4)
EOSINOPHIL NFR BLD AUTO: 1.3 % (ref 0.3–6.2)
ERYTHROCYTE [DISTWIDTH] IN BLOOD BY AUTOMATED COUNT: 12.8 % (ref 12.3–15.4)
FERRITIN SERPL-MCNC: 46.2 NG/ML (ref 13–150)
GFR SERPL CREATININE-BSD FRML MDRD: 125 ML/MIN/1.73
GFR SERPL CREATININE-BSD FRML MDRD: >150 ML/MIN/1.73
GLOBULIN UR ELPH-MCNC: 2.8 GM/DL
GLUCOSE SERPL-MCNC: 114 MG/DL (ref 65–99)
HCT VFR BLD AUTO: 41.3 % (ref 34–46.6)
HGB BLD-MCNC: 12.9 G/DL (ref 12–15.9)
IMM GRANULOCYTES # BLD AUTO: 0.01 10*3/MM3 (ref 0–0.05)
IMM GRANULOCYTES NFR BLD AUTO: 0.1 % (ref 0–0.5)
IRON 24H UR-MRATE: 106 MCG/DL (ref 37–145)
IRON SATN MFR SERPL: 26 % (ref 20–50)
LYMPHOCYTES # BLD AUTO: 2.02 10*3/MM3 (ref 0.7–3.1)
LYMPHOCYTES NFR BLD AUTO: 29.4 % (ref 19.6–45.3)
MCH RBC QN AUTO: 28 PG (ref 26.6–33)
MCHC RBC AUTO-ENTMCNC: 31.2 G/DL (ref 31.5–35.7)
MCV RBC AUTO: 89.6 FL (ref 79–97)
MONOCYTES # BLD AUTO: 0.46 10*3/MM3 (ref 0.1–0.9)
MONOCYTES NFR BLD AUTO: 6.7 % (ref 5–12)
NEUTROPHILS NFR BLD AUTO: 4.26 10*3/MM3 (ref 1.7–7)
NEUTROPHILS NFR BLD AUTO: 62.2 % (ref 42.7–76)
NRBC BLD AUTO-RTO: 0 /100 WBC (ref 0–0.2)
PLAT MORPH BLD: NORMAL
PLATELET # BLD AUTO: 93 10*3/MM3 (ref 140–450)
POTASSIUM SERPL-SCNC: 3.8 MMOL/L (ref 3.5–5.2)
PROT SERPL-MCNC: 7.3 G/DL (ref 6–8.5)
RBC # BLD AUTO: 4.61 10*6/MM3 (ref 3.77–5.28)
RBC MORPH BLD: NORMAL
SODIUM SERPL-SCNC: 140 MMOL/L (ref 136–145)
TIBC SERPL-MCNC: 404 MCG/DL (ref 298–536)
TRANSFERRIN SERPL-MCNC: 271 MG/DL (ref 200–360)
VIT B12 BLD-MCNC: 198 PG/ML (ref 211–946)
WBC # BLD AUTO: 6.86 10*3/MM3 (ref 3.4–10.8)
WBC MORPH BLD: NORMAL

## 2021-04-15 PROCEDURE — 82607 VITAMIN B-12: CPT | Performed by: INTERNAL MEDICINE

## 2021-04-15 PROCEDURE — 84466 ASSAY OF TRANSFERRIN: CPT | Performed by: INTERNAL MEDICINE

## 2021-04-15 PROCEDURE — 85025 COMPLETE CBC W/AUTO DIFF WBC: CPT | Performed by: INTERNAL MEDICINE

## 2021-04-15 PROCEDURE — 80053 COMPREHEN METABOLIC PANEL: CPT | Performed by: INTERNAL MEDICINE

## 2021-04-15 PROCEDURE — 83540 ASSAY OF IRON: CPT | Performed by: INTERNAL MEDICINE

## 2021-04-15 PROCEDURE — 82728 ASSAY OF FERRITIN: CPT | Performed by: INTERNAL MEDICINE

## 2021-04-15 PROCEDURE — 85007 BL SMEAR W/DIFF WBC COUNT: CPT | Performed by: INTERNAL MEDICINE

## 2021-04-15 PROCEDURE — 36415 COLL VENOUS BLD VENIPUNCTURE: CPT

## 2021-04-15 PROCEDURE — 99214 OFFICE O/P EST MOD 30 MIN: CPT | Performed by: INTERNAL MEDICINE

## 2021-04-15 NOTE — PROGRESS NOTES
Subjective   Kathleen Claire is a 31 y.o. female.  Referred by  for evaluation of thrombocytopenia.      History of Present Illness    is a 30-year-old premenopausal  lady with longstanding history of thrombocytopenia.  She was diagnosed with thrombocytopenia prior to having her first child about 8 years ago.  She reports being placed on steroids recently around the time of delivery of her second child about 9 months ago.  At that point she was evaluated by Dr. Nehemias Carmen at [facility] who recommended steroids to help increase the platelet count.  100,000 to facilitate epidural.  She moved to [place] about a year ago from [place] where she reportedly had seen a hematologist and was diagnosed with ITP.  She denies being on any medications chronically except for levothyroxine, denies any new herbal medications or herbal teas.  On review of the labs she does have B12 deficiency with a B12 level at 100.  She has been on oral B12 replacement however she is has not taken that since she got pregnant with her second child.  She has never been on parenteral B12 replacement.  She denies any excessive bleeding or bruising.  Denies any risk factors for HIV or hepatitis B.  Denies any history of alcohol use.  She reports a well-rounded diet.   Review of labs over the past year showed platelet counts ranging from ,000.Iron studies performed November 2019 suggestive of iron deficiency with ferritin of 7 and iron saturation 19.  She reports excessive fatigue.  Denies any recent weight changes, no fevers, no chills, no new lymphadenopathy.    Interval history  Ms. Claire presents to the clinic today for follow-up.  She has lost about 5 kg since her last visit.  She tells me that she has been more active with her 15-month-old and has also altered her diet to help with weight loss.  Reports that the fatigue has improved since she has been on B12 supplementation as well as iron supplementation.  She  has been somewhat noncompliant with the B12 supplementation has only been taking 500 mcg daily.  She reports a well-rounded diet.  No other new complaints at this time  No excessive bleeding or bruising      The following portions of the patient's history were reviewed and updated as appropriate: allergies, current medications, past family history, past medical history, past social history, past surgical history and problem list.    No past medical history on file.     Past Surgical History:   Procedure Laterality Date   •  SECTION          No family history on file.     Social History     Socioeconomic History   • Marital status:      Spouse name: Not on file   • Number of children: Not on file   • Years of education: Not on file   • Highest education level: Not on file   Tobacco Use   • Smoking status: Never Smoker   • Smokeless tobacco: Never Used   Substance and Sexual Activity   • Alcohol use: Never   • Drug use: Never   • Sexual activity: Yes        OB History    No obstetric history on file.          No Known Allergies         Review of Systems   Constitutional: Positive for fatigue. Negative for activity change, appetite change, chills, diaphoresis, fever, unexpected weight gain and unexpected weight loss.   HENT: Negative for congestion, dental problem, drooling, ear discharge, ear pain, facial swelling, hearing loss, mouth sores, nosebleeds, postnasal drip, rhinorrhea, sinus pressure, sneezing, sore throat, swollen glands, tinnitus, trouble swallowing and voice change.    Eyes: Negative for blurred vision, double vision, photophobia, pain, discharge, redness, itching and visual disturbance.   Respiratory: Negative for apnea, cough, choking, chest tightness, shortness of breath, wheezing and stridor.    Cardiovascular: Negative for chest pain, palpitations and leg swelling.   Gastrointestinal: Negative for abdominal distention, abdominal pain, anal bleeding, blood in stool, constipation,  diarrhea, nausea, rectal pain, vomiting, GERD and indigestion.   Endocrine: Negative for cold intolerance, heat intolerance, polydipsia, polyphagia and polyuria.   Genitourinary: Negative for amenorrhea, breast discharge, breast lump, breast pain, decreased libido, decreased urine volume, difficulty urinating, dyspareunia, dysuria, flank pain, frequency, genital sores, hematuria, menstrual problem, pelvic pain, pelvic pressure, urgency, urinary incontinence, vaginal bleeding, vaginal discharge and vaginal pain.   Musculoskeletal: Negative for arthralgias, back pain, gait problem, joint swelling, myalgias, neck pain, neck stiffness and bursitis.   Skin: Negative for color change, dry skin, pallor, rash, skin lesions and bruise.   Allergic/Immunologic: Negative for environmental allergies, food allergies and immunocompromised state.   Neurological: Negative for dizziness, tremors, seizures, syncope, facial asymmetry, speech difficulty, weakness, light-headedness, numbness, headache, memory problem and confusion.   Hematological: Negative for adenopathy. Does not bruise/bleed easily.   Psychiatric/Behavioral: Negative for agitation, behavioral problems, decreased concentration, dysphoric mood, hallucinations, self-injury, sleep disturbance, suicidal ideas, negative for hyperactivity, depressed mood and stress. The patient is not nervous/anxious.      I have reviewed and confirmed the accuracy of the ROS as documented by the MA/LPN/RN Beatriz Valencia MD      Objective   not currently breastfeeding.   Physical Exam  Constitutional:       Appearance: Normal appearance. She is normal weight.   HENT:      Head: Normocephalic.      Right Ear: External ear normal.      Left Ear: External ear normal.      Nose: Nose normal. No congestion or rhinorrhea.      Mouth/Throat:      Mouth: Mucous membranes are moist.      Pharynx: Oropharynx is clear. No oropharyngeal exudate or posterior oropharyngeal erythema.   Eyes:       General: No scleral icterus.        Right eye: No discharge.         Left eye: No discharge.      Extraocular Movements: Extraocular movements intact.      Conjunctiva/sclera: Conjunctivae normal.      Pupils: Pupils are equal, round, and reactive to light.   Neck:      Vascular: No carotid bruit.   Cardiovascular:      Rate and Rhythm: Normal rate and regular rhythm.      Heart sounds: No murmur heard.   No gallop.    Pulmonary:      Effort: Pulmonary effort is normal. No respiratory distress.      Breath sounds: No stridor. No wheezing, rhonchi or rales.   Chest:      Chest wall: No tenderness.   Abdominal:      General: Abdomen is flat. Bowel sounds are normal. There is no distension.      Palpations: Abdomen is soft. There is no mass.      Tenderness: There is no abdominal tenderness. There is no guarding or rebound.      Hernia: No hernia is present.   Musculoskeletal:         General: No swelling, tenderness, deformity or signs of injury. Normal range of motion.      Cervical back: Normal range of motion. No rigidity. No muscular tenderness.      Right lower leg: No edema.      Left lower leg: No edema.   Lymphadenopathy:      Cervical: No cervical adenopathy.   Skin:     General: Skin is warm and dry.      Coloration: Skin is not jaundiced or pale.      Findings: No bruising, erythema, lesion or rash.   Neurological:      General: No focal deficit present.      Mental Status: She is alert. Mental status is at baseline. She is disoriented.      Cranial Nerves: No cranial nerve deficit.      Sensory: No sensory deficit.      Motor: No weakness.      Coordination: Coordination normal.      Gait: Gait normal.      Deep Tendon Reflexes: Reflexes normal.   Psychiatric:         Mood and Affect: Mood normal.         Behavior: Behavior normal.         Thought Content: Thought content normal.         Judgment: Judgment normal.       I have reexamined the patient and the results are consistent with the previously  documented exam. Beatriz Valencia MD     No visits with results within 30 Day(s) from this visit.   Latest known visit with results is:   Consult on 09/17/2020   Component Date Value Ref Range Status   • Folate 09/17/2020 13.50  4.78 - 24.20 ng/mL Final   • Vitamin B-12 09/17/2020 217  211 - 946 pg/mL Final   • Ferritin 09/17/2020 39.20  13.00 - 150.00 ng/mL Final   • Iron 09/17/2020 77  37 - 145 mcg/dL Final   • Iron Saturation 09/17/2020 19* 20 - 50 % Final   • Transferrin 09/17/2020 279  200 - 360 mg/dL Final   • TIBC 09/17/2020 416  298 - 536 mcg/dL Final   • IPF 09/17/2020 28.90* 0.90 - 6.50 % Final   • Platelets 09/17/2020 115* 140 - 450 10*3/mm3 Final   • PTT 09/17/2020 29.3  22.7 - 35.4 seconds Final   • Protime 09/17/2020 12.6  11.7 - 14.2 Seconds Final   • INR 09/17/2020 0.95  0.90 - 1.10 Final   • Glucose 09/17/2020 111* 65 - 99 mg/dL Final   • BUN 09/17/2020 10  6 - 20 mg/dL Final   • Creatinine 09/17/2020 0.50* 0.57 - 1.00 mg/dL Final   • Sodium 09/17/2020 140  136 - 145 mmol/L Final   • Potassium 09/17/2020 3.7  3.5 - 5.2 mmol/L Final   • Chloride 09/17/2020 107  98 - 107 mmol/L Final   • CO2 09/17/2020 25.4  22.0 - 29.0 mmol/L Final   • Calcium 09/17/2020 9.2  8.6 - 10.5 mg/dL Final   • Total Protein 09/17/2020 7.4  6.0 - 8.5 g/dL Final   • Albumin 09/17/2020 4.70  3.50 - 5.20 g/dL Final   • ALT (SGPT) 09/17/2020 23  1 - 33 U/L Final   • AST (SGOT) 09/17/2020 20  1 - 32 U/L Final   • Alkaline Phosphatase 09/17/2020 67  39 - 117 U/L Final   • Total Bilirubin 09/17/2020 0.3  0.0 - 1.2 mg/dL Final   • eGFR Non African Amer 09/17/2020 143  >60 mL/min/1.73 Final   • eGFR   Amer 09/17/2020 >150  >60 mL/min/1.73 Final   • Globulin 09/17/2020 2.7  gm/dL Final   • A/G Ratio 09/17/2020 1.7  g/dL Final   • BUN/Creatinine Ratio 09/17/2020 20.0  7.0 - 25.0 Final   • Anion Gap 09/17/2020 7.6  5.0 - 15.0 mmol/L Final   • HIV Screen 4th Gen w/RFX (Referenc* 09/17/2020 Non Reactive  Non Reactive Final        No  radiology results for the last 30 days.     4/15/2021   CBC-WBC normal at 6.86, hemoglobin 12.9, platelets 93,000  CMP-blood sugar elevated at 114, BUN 10, creatinine 0.56, LFTs normal  Ferritin 46.2  Iron profile shows a normal iron saturation of 26%, TIBC 404, iron 106  B12        Assessment/Plan   There are no diagnoses linked to this encounter.   is a 31 yr old premenopausal lady with long standing thrombocytopenia here for evaluation of the thrombocytoepnia    1.Thrombocytopenia  · Secondary to ITP given elevated IPF  · Platelet count stable at 93,000  · B12 deficiency may be contributing  · Continue B12 replacement  · B12 level remains low at 198    2.  B12 deficiency  · Patient has only been taking 500 mcg of B12  · Recommend increasing the dose to 1000 mcg  · B12 level today 198    3.iron deficiency  · Hemoglobin normal  · Iron saturation improved to 26% with iron supplementation  · Continue ferrous sulfate 325 mg every other day    4.fatigue  · TSH level recently checked and normal  · Improved with iron B12 supplementation   · Continue the same    5.hypothyroidism  · Continue levothyroxine    6.follow-up-6 months with CBC, CMP, iron studies, vitamin B12  B12 level will be checked in 1 month.  If it remains low may have to start parenteral supplementation.

## 2021-04-15 NOTE — TELEPHONE ENCOUNTER
Reviewed B12 level with Ms Claire.  Instructed her to increase her oral B12 to 2000mcg per Dr Valencia's instructions.  Will repeat her b12 level in one month.  Lab order entered and message to appt desk to schedule lab appt in one month.

## 2021-05-13 ENCOUNTER — TELEPHONE (OUTPATIENT)
Dept: ONCOLOGY | Facility: CLINIC | Age: 31
End: 2021-05-13

## 2021-05-13 ENCOUNTER — LAB (OUTPATIENT)
Dept: OTHER | Facility: HOSPITAL | Age: 31
End: 2021-05-13

## 2021-05-13 DIAGNOSIS — D69.6 THROMBOCYTOPENIA (HCC): ICD-10-CM

## 2021-05-13 DIAGNOSIS — E53.8 B12 DEFICIENCY: ICD-10-CM

## 2021-05-13 LAB — VIT B12 BLD-MCNC: 725 PG/ML (ref 211–946)

## 2021-05-13 PROCEDURE — 36415 COLL VENOUS BLD VENIPUNCTURE: CPT

## 2021-05-13 PROCEDURE — 82607 VITAMIN B-12: CPT | Performed by: INTERNAL MEDICINE

## 2021-05-13 NOTE — TELEPHONE ENCOUNTER
Reviewed B12 level with pt and Dr Valencia's instructions to continue oral b12.  Ms Dakotah inquired if she should continue 2000mg or go back 1000mg.  Per Dr Valencia she can continue 2000mcg.  Pt v/u, asked her to call the office if she has any further questions or concerns.

## 2021-05-14 ENCOUNTER — TELEPHONE (OUTPATIENT)
Dept: ONCOLOGY | Facility: CLINIC | Age: 31
End: 2021-05-14

## 2021-05-14 NOTE — TELEPHONE ENCOUNTER
Ms Dakotah called today inquiring if it's ok for her to get the covid vaccine given her history of thrombocytopenia.  She is scheduled for the moderna vaccine.  I explained that it is ok for her to get the vaccine.  I also offered a follow up lab appt if that was something that would make her feel more comfortable.  She states she will call if she feels like she needs to have her labs checked.  Asked her to call for any other questions or concerns.

## 2021-05-25 RX ORDER — LEVOTHYROXINE SODIUM 0.05 MG/1
TABLET ORAL
Qty: 90 TABLET | Refills: 0 | Status: SHIPPED | OUTPATIENT
Start: 2021-05-25 | End: 2021-08-18 | Stop reason: SDUPTHER

## 2021-06-10 ENCOUNTER — LAB (OUTPATIENT)
Dept: LAB | Facility: HOSPITAL | Age: 31
End: 2021-06-10

## 2021-06-10 ENCOUNTER — OFFICE VISIT (OUTPATIENT)
Dept: INTERNAL MEDICINE | Facility: CLINIC | Age: 31
End: 2021-06-10

## 2021-06-10 VITALS
OXYGEN SATURATION: 99 % | HEIGHT: 63 IN | WEIGHT: 165.1 LBS | DIASTOLIC BLOOD PRESSURE: 68 MMHG | HEART RATE: 89 BPM | SYSTOLIC BLOOD PRESSURE: 106 MMHG | BODY MASS INDEX: 29.25 KG/M2

## 2021-06-10 DIAGNOSIS — E66.3 OVERWEIGHT (BMI 25.0-29.9): ICD-10-CM

## 2021-06-10 DIAGNOSIS — E03.9 ACQUIRED HYPOTHYROIDISM: Primary | ICD-10-CM

## 2021-06-10 LAB
T4 FREE SERPL-MCNC: 1.19 NG/DL (ref 0.93–1.7)
TSH SERPL DL<=0.05 MIU/L-ACNC: 3.01 UIU/ML (ref 0.27–4.2)

## 2021-06-10 PROCEDURE — 99213 OFFICE O/P EST LOW 20 MIN: CPT | Performed by: FAMILY MEDICINE

## 2021-06-10 PROCEDURE — 84439 ASSAY OF FREE THYROXINE: CPT | Performed by: FAMILY MEDICINE

## 2021-06-10 PROCEDURE — 84443 ASSAY THYROID STIM HORMONE: CPT | Performed by: FAMILY MEDICINE

## 2021-06-10 PROCEDURE — 36415 COLL VENOUS BLD VENIPUNCTURE: CPT | Performed by: FAMILY MEDICINE

## 2021-06-10 RX ORDER — FERROUS SULFATE 325(65) MG
325 TABLET ORAL EVERY OTHER DAY
COMMUNITY
Start: 2021-04-26 | End: 2022-04-14 | Stop reason: SDUPTHER

## 2021-06-10 NOTE — PROGRESS NOTES
"Chief Complaint  follow up to hypothyroidism    Subjective          Kathleen Claire presents to [facility]  History of Present Illness  Patient follows up for hypothyroidism adequately replaced with levothyroxine 50 mcg daily no symptoms of dry skin weight gain irregular heartbeat change in bowel habits  Objective   Vital Signs:   /68 (BP Location: Right arm, Patient Position: Sitting, Cuff Size: Adult)   Pulse 89   Ht 160 cm (62.99\")   Wt 74.9 kg (165 lb 1.6 oz)   SpO2 99%   BMI 29.26 kg/m²     Physical Exam  Constitutional:       Appearance: Normal appearance.   HENT:      Head: Normocephalic and atraumatic.   Neck:      Comments: No enlarged thyroid felt  Cardiovascular:      Rate and Rhythm: Normal rate and regular rhythm.      Pulses: Normal pulses.   Pulmonary:      Effort: Pulmonary effort is normal.      Breath sounds: Normal breath sounds.   Musculoskeletal:      Cervical back: Neck supple. No tenderness.      Right lower leg: No edema.      Left lower leg: No edema.   Neurological:      Mental Status: She is alert.   Psychiatric:         Mood and Affect: Mood normal.         Behavior: Behavior normal.         Thought Content: Thought content normal.         Judgment: Judgment normal.        Result Review :     Common labs    Common Labsle 8/3/20 8/3/20 9/17/20 9/17/20 9/17/20 4/15/21 4/15/21    0951 [id number] 1523 1622 1622 0936 0936   Glucose 116 (A)    111 (A)  114 (A)   BUN 10    10  10   Creatinine 0.60    0.50 (A)  0.56 (A)   eGFR Non African Am 116    143  125   eGFR  Am 140    >150  >150   Sodium 137    140  140   Potassium 3.6    3.7  3.8   Chloride 101    107  103   Calcium 9.2    9.2  9.2   Albumin 4.80    4.70  4.50   Total Bilirubin 0.5    0.3  0.5   Alkaline Phosphatase 57    67  69   AST (SGOT) 22    20  15   ALT (SGPT) 34 (A)    23  12   WBC  5.03 6.09   6.86    Hemoglobin  12.8 13.1   12.9    Hematocrit  40.8 39.0   41.3    Platelets  99 (A) 95 (A) " 115 (A)  93 (A)    (A) Abnormal value                      Assessment and Plan    Diagnoses and all orders for this visit:    1. Acquired hypothyroidism (Primary)  -     T4, Free  -     TSH    2. Overweight (BMI 25.0-29.9)    Follow-up results of thyroid testing for adequate replacement therapy otherwise as needed or    Follow Up   Return if symptoms worsen or fail to improve, for Next scheduled follow up, Annual physical.  Patient was given instructions and counseling regarding her condition or for health maintenance advice. Please see specific information pulled into the AVS if appropriate.

## 2021-08-18 ENCOUNTER — OFFICE VISIT (OUTPATIENT)
Dept: INTERNAL MEDICINE | Facility: CLINIC | Age: 31
End: 2021-08-18

## 2021-08-18 ENCOUNTER — LAB (OUTPATIENT)
Dept: LAB | Facility: HOSPITAL | Age: 31
End: 2021-08-18

## 2021-08-18 VITALS
BODY MASS INDEX: 28.77 KG/M2 | HEART RATE: 77 BPM | WEIGHT: 162.4 LBS | OXYGEN SATURATION: 99 % | HEIGHT: 63 IN | DIASTOLIC BLOOD PRESSURE: 66 MMHG | SYSTOLIC BLOOD PRESSURE: 114 MMHG

## 2021-08-18 DIAGNOSIS — Z00.00 HEALTHCARE MAINTENANCE: Primary | ICD-10-CM

## 2021-08-18 DIAGNOSIS — D51.8 OTHER VITAMIN B12 DEFICIENCY ANEMIA: ICD-10-CM

## 2021-08-18 DIAGNOSIS — K21.00 GASTROESOPHAGEAL REFLUX DISEASE WITH ESOPHAGITIS WITHOUT HEMORRHAGE: ICD-10-CM

## 2021-08-18 DIAGNOSIS — E03.9 ACQUIRED HYPOTHYROIDISM: ICD-10-CM

## 2021-08-18 DIAGNOSIS — M54.2 NECK PAIN ON LEFT SIDE: ICD-10-CM

## 2021-08-18 DIAGNOSIS — E66.3 OVERWEIGHT (BMI 25.0-29.9): ICD-10-CM

## 2021-08-18 PROBLEM — D51.9 ANEMIA DUE TO VITAMIN B12 DEFICIENCY: Status: ACTIVE | Noted: 2021-08-18

## 2021-08-18 LAB
CHOLEST SERPL-MCNC: 146 MG/DL (ref 0–200)
HDLC SERPL-MCNC: 43 MG/DL (ref 40–60)
LDLC SERPL CALC-MCNC: 86 MG/DL (ref 0–100)
LDLC/HDLC SERPL: 1.98 {RATIO}
TRIGL SERPL-MCNC: 89 MG/DL (ref 0–150)
VLDLC SERPL-MCNC: 17 MG/DL (ref 5–40)

## 2021-08-18 PROCEDURE — 99395 PREV VISIT EST AGE 18-39: CPT | Performed by: FAMILY MEDICINE

## 2021-08-18 PROCEDURE — 83921 ORGANIC ACID SINGLE QUANT: CPT | Performed by: FAMILY MEDICINE

## 2021-08-18 PROCEDURE — 80061 LIPID PANEL: CPT | Performed by: FAMILY MEDICINE

## 2021-08-18 PROCEDURE — 99214 OFFICE O/P EST MOD 30 MIN: CPT | Performed by: FAMILY MEDICINE

## 2021-08-18 PROCEDURE — 36415 COLL VENOUS BLD VENIPUNCTURE: CPT | Performed by: FAMILY MEDICINE

## 2021-08-18 RX ORDER — TIZANIDINE 2 MG/1
2 TABLET ORAL NIGHTLY PRN
Qty: 30 TABLET | Refills: 1 | Status: SHIPPED | OUTPATIENT
Start: 2021-08-18 | End: 2022-03-01

## 2021-08-18 RX ORDER — LEVOTHYROXINE SODIUM 0.05 MG/1
50 TABLET ORAL DAILY
Qty: 90 TABLET | Refills: 2 | Status: SHIPPED | OUTPATIENT
Start: 2021-08-18 | End: 2021-09-21 | Stop reason: SDUPTHER

## 2021-08-18 NOTE — PROGRESS NOTES
Subjective   Kathleen Claire is a 31 y.o. female.     Chief Complaint   Patient presents with   • Annual Exam         History of Present Illness   Kathleen Claire 31 y.o. female who presents for an Annual Wellness Visit.  she has a history of   Patient Active Problem List   Diagnosis   • Acquired hypothyroidism   • Healthcare maintenance   • Overweight (BMI 25.0-29.9)   • Thrombocytopenia (CMS/HCC)   • Gastroesophageal reflux disease with esophagitis without hemorrhage   • Neck pain on left side   • Anemia due to vitamin B12 deficiency   .  she has been feeling well.  Labs results discussed in detail with the patient.  Plan to update vaccines if needed today.  I  reviewed health maintenance with her as part of my preventative care plan.    Health Habits:  Dental Exam. up to date  Eye Exam. due  Exercise: 3 times/week.  Current exercise activities include: walking      The following portions of the patient's history were reviewed and updated as appropriate: allergies, current medications, past family history, past medical history, past social history, past surgical history and problem list.    Review of Systems   Constitutional: Negative for appetite change, fever and unexpected weight change.   HENT: Negative for ear pain, facial swelling and sore throat.    Eyes: Negative for pain and visual disturbance.   Respiratory: Negative for chest tightness, shortness of breath and wheezing.    Cardiovascular: Negative for chest pain and palpitations.   Gastrointestinal: Negative for abdominal pain and blood in stool.   Endocrine: Negative.    Genitourinary: Negative for difficulty urinating and hematuria.   Musculoskeletal: Positive for myalgias and neck pain. Negative for joint swelling.   Neurological: Negative for tremors, seizures and syncope.   Hematological: Negative for adenopathy.   Psychiatric/Behavioral: Negative.        Objective   Physical Exam  Vitals and nursing note reviewed.   Constitutional:       Appearance:  Normal appearance. She is well-developed. She is not diaphoretic.   HENT:      Head: Normocephalic and atraumatic.      Right Ear: Tympanic membrane, ear canal and external ear normal.      Left Ear: Tympanic membrane, ear canal and external ear normal.      Mouth/Throat:      Mouth: Mucous membranes are moist.      Pharynx: Oropharynx is clear. No posterior oropharyngeal erythema.   Eyes:      General: Lids are normal. No scleral icterus.     Extraocular Movements: Extraocular movements intact.      Conjunctiva/sclera: Conjunctivae normal.      Pupils: Pupils are equal, round, and reactive to light.   Neck:      Thyroid: No thyroid mass or thyromegaly.      Vascular: No carotid bruit or JVD.      Comments: No enlarged thyroid felt or nodule  Cardiovascular:      Rate and Rhythm: Normal rate and regular rhythm.      Pulses: Normal pulses.           Radial pulses are 2+ on the right side and 2+ on the left side.      Heart sounds: Normal heart sounds. No murmur heard.     Pulmonary:      Effort: Pulmonary effort is normal. No respiratory distress.      Breath sounds: Normal breath sounds.   Abdominal:      Palpations: Abdomen is soft.      Tenderness: There is no right CVA tenderness or left CVA tenderness.   Musculoskeletal:      Cervical back: Normal range of motion and neck supple. No tenderness.      Right lower leg: No edema.      Left lower leg: No edema.   Skin:     General: Skin is warm and dry.      Coloration: Skin is not pale.      Findings: No erythema or rash.   Neurological:      General: No focal deficit present.      Mental Status: She is alert and oriented to person, place, and time.      Sensory: No sensory deficit.      Deep Tendon Reflexes: Reflexes are normal and symmetric.   Psychiatric:         Mood and Affect: Mood normal.         Behavior: Behavior normal. Behavior is cooperative.         Thought Content: Thought content normal.         Judgment: Judgment normal.         Assessment/Plan    Diagnoses and all orders for this visit:    1. Healthcare maintenance (Primary)  -     Lipid Panel    2. Acquired hypothyroidism  -     levothyroxine (SYNTHROID, LEVOTHROID) 50 MCG tablet; Take 1 tablet by mouth Daily.  Dispense: 90 tablet; Refill: 2    3. Gastroesophageal reflux disease with esophagitis without hemorrhage    4. Neck pain on left side  -     tiZANidine (ZANAFLEX) 2 MG tablet; Take 1 tablet by mouth At Night As Needed for Muscle Spasms.  Dispense: 30 tablet; Refill: 1    5. Other vitamin B12 deficiency anemia  -     Methylmalonic Acid, Serum      Continue attempts at healthy lifestyle calorie appropriate diet she has lost 12 pounds in the last 9 months with exercise and diet  Immunizations are current  Neck stretching exercises recommended  Continue routine follow-up with hematology  Follow-up otherwise as needed for ongoing management of chronic problems or preventatively annually

## 2021-08-18 NOTE — PROGRESS NOTES
"Chief Complaint  Annual Exam    Subjective          Kathleen Claire presents to [facility] PRIMARY CARE  History of Present Illness  Patient follows up for ongoing management of hypothyroidism [place] management of with vitamin B12 as well as development of left-sided neck pain when she is fatigued she has history of GERD and had esophageal dilation due to reflux however she does not have reflux very often but she does take Tums once or twice a month she has had Pepcid in the past but presently is not taking  Her neck pain she points to the left side presently painless however she realizes that when she has periods of fatigue she has more pain in this area there is no arm involvement at all no vision changes or hearing loss  Has been fairly stable on levothyroxine at 50 mcg daily  She is followed by hematology for thrombocytopenia she has been prescribed vitamin D B12 to maintain platelet integrity  Objective   Vital Signs:   /66 (BP Location: Right arm, Patient Position: Sitting, Cuff Size: Adult)   Pulse 77   Ht 160 cm (62.99\")   Wt 73.7 kg (162 lb 6.4 oz)   SpO2 99%   BMI 28.78 kg/m²     Physical Exam  Vitals and nursing note reviewed.   Constitutional:       Appearance: Normal appearance. She is well-developed. She is not diaphoretic.   HENT:      Head: Normocephalic and atraumatic.      Right Ear: Tympanic membrane, ear canal and external ear normal.      Left Ear: Tympanic membrane, ear canal and external ear normal.      Mouth/Throat:      Pharynx: No posterior oropharyngeal erythema.   Eyes:      General: Lids are normal. No scleral icterus.     Extraocular Movements: Extraocular movements intact.      Conjunctiva/sclera: Conjunctivae normal.   Neck:      Thyroid: No thyroid mass or thyromegaly.      Vascular: No carotid bruit or JVD.      Comments: No enlarged thyroid or nodules felt  Cardiovascular:      Rate and Rhythm: Normal rate and regular rhythm.      Pulses: Normal pulses. "           Radial pulses are 2+ on the right side and 2+ on the left side.      Heart sounds: Normal heart sounds. No murmur heard.     Pulmonary:      Effort: Pulmonary effort is normal. No respiratory distress.      Breath sounds: Normal breath sounds.   Abdominal:      Palpations: Abdomen is soft.      Tenderness: There is no right CVA tenderness or left CVA tenderness.   Musculoskeletal:      Cervical back: Normal range of motion and neck supple. No tenderness.      Right lower leg: No edema.      Left lower leg: No edema.   Skin:     General: Skin is warm and dry.      Coloration: Skin is not pale.      Findings: No erythema or rash.   Neurological:      General: No focal deficit present.      Mental Status: She is alert and oriented to person, place, and time.      Sensory: No sensory deficit.      Deep Tendon Reflexes: Reflexes are normal and symmetric.   Psychiatric:         Mood and Affect: Mood normal.         Behavior: Behavior normal. Behavior is cooperative.         Thought Content: Thought content normal.         Judgment: Judgment normal.        Result Review :     Common labs    Common Labsle 9/17/20 9/17/20 9/17/20 4/15/21 4/15/21    1523 1622 1622 0936 0936   Glucose   111 (A)  114 (A)   BUN   10  10   Creatinine   0.50 (A)  0.56 (A)   eGFR Non  Am   143  125   eGFR  Am   >150  >150   Sodium   140  140   Potassium   3.7  3.8   Chloride   107  103   Calcium   9.2  9.2   Albumin   4.70  4.50   Total Bilirubin   0.3  0.5   Alkaline Phosphatase   67  69   AST (SGOT)   20  15   ALT (SGPT)   23  12   WBC 6.09   6.86    Hemoglobin 13.1   12.9    Hematocrit 39.0   41.3    Platelets 95 (A) 115 (A)  93 (A)    (A) Abnormal value                      Assessment and Plan    Diagnoses and all orders for this visit:    1. Healthcare maintenance (Primary)  -     Lipid Panel    2. Acquired hypothyroidism  -     levothyroxine (SYNTHROID, LEVOTHROID) 50 MCG tablet; Take 1 tablet by mouth Daily.   Dispense: 90 tablet; Refill: 2    3. Gastroesophageal reflux disease with esophagitis without hemorrhage    4. Neck pain on left side  -     tiZANidine (ZANAFLEX) 2 MG tablet; Take 1 tablet by mouth At Night As Needed for Muscle Spasms.  Dispense: 30 tablet; Refill: 1    5. Other vitamin B12 deficiency anemia  -     Methylmalonic Acid, Serum    6. Overweight (BMI 25.0-29.9)    Hypothyroidism continue levothyroxine 50 mcg daily  GERD intermittent use of Tums if becomes more frequent recommend PPI or H2 blocker  Exercises shown for neck pain  Follow-up results of B12 monitoring as well as lipid panel  Otherwise as needed or      Follow Up   Return in about 6 months (around 2/18/2022), or if symptoms worsen or fail to improve, for Recheck.  Patient was given instructions and counseling regarding her condition or for health maintenance advice. Please see specific information pulled into the AVS if appropriate.

## 2021-08-22 LAB
Lab: NORMAL
METHYLMALONATE SERPL-SCNC: 85 NMOL/L (ref 0–378)

## 2021-09-14 ENCOUNTER — TELEPHONE (OUTPATIENT)
Dept: INTERNAL MEDICINE | Facility: CLINIC | Age: 31
End: 2021-09-14

## 2021-09-14 ENCOUNTER — TELEPHONE (OUTPATIENT)
Dept: ONCOLOGY | Facility: CLINIC | Age: 31
End: 2021-09-14

## 2021-09-14 NOTE — TELEPHONE ENCOUNTER
----- Message from Piper Kowalski RN sent at 9/14/2021 12:44 PM EDT -----  She called me today requesting a sooner appt, I explained that Dr Valencia was pretty full but would check.  Can you see if we can get her in maybe the week after rounds?  I'm sure that's already full though....     Thank you

## 2021-09-14 NOTE — TELEPHONE ENCOUNTER
Caller: Kathleen Claire    Relationship: Self    Best call back number: [phone]     Caller requesting test results: SELF    What test was performed: LABS    When was the test performed: 8/18/2021      PATIENT HAS REQUESTED THE LAB RESULTS FOR HER B12 LEVELS

## 2021-09-16 ENCOUNTER — OFFICE VISIT (OUTPATIENT)
Dept: ONCOLOGY | Facility: CLINIC | Age: 31
End: 2021-09-16

## 2021-09-16 ENCOUNTER — INFUSION (OUTPATIENT)
Dept: ONCOLOGY | Facility: HOSPITAL | Age: 31
End: 2021-09-16

## 2021-09-16 ENCOUNTER — TELEPHONE (OUTPATIENT)
Dept: ONCOLOGY | Facility: CLINIC | Age: 31
End: 2021-09-16

## 2021-09-16 ENCOUNTER — LAB (OUTPATIENT)
Dept: OTHER | Facility: HOSPITAL | Age: 31
End: 2021-09-16

## 2021-09-16 VITALS
SYSTOLIC BLOOD PRESSURE: 118 MMHG | OXYGEN SATURATION: 98 % | HEART RATE: 82 BPM | BODY MASS INDEX: 29.11 KG/M2 | DIASTOLIC BLOOD PRESSURE: 76 MMHG | TEMPERATURE: 97.7 F | HEIGHT: 63 IN | RESPIRATION RATE: 18 BRPM | WEIGHT: 164.3 LBS

## 2021-09-16 DIAGNOSIS — D51.8 OTHER VITAMIN B12 DEFICIENCY ANEMIA: ICD-10-CM

## 2021-09-16 DIAGNOSIS — E53.8 B12 DEFICIENCY: ICD-10-CM

## 2021-09-16 DIAGNOSIS — E53.8 B12 DEFICIENCY: Primary | ICD-10-CM

## 2021-09-16 LAB
BASOPHILS # BLD AUTO: 0.03 10*3/MM3 (ref 0–0.2)
BASOPHILS NFR BLD AUTO: 0.5 % (ref 0–1.5)
DEPRECATED RDW RBC AUTO: 42.4 FL (ref 37–54)
EOSINOPHIL # BLD AUTO: 0.04 10*3/MM3 (ref 0–0.4)
EOSINOPHIL NFR BLD AUTO: 0.7 % (ref 0.3–6.2)
ERYTHROCYTE [DISTWIDTH] IN BLOOD BY AUTOMATED COUNT: 13 % (ref 12.3–15.4)
FERRITIN SERPL-MCNC: 38 NG/ML (ref 13–150)
HCT VFR BLD AUTO: 40.3 % (ref 34–46.6)
HGB BLD-MCNC: 12.6 G/DL (ref 12–15.9)
IMM GRANULOCYTES # BLD AUTO: 0.05 10*3/MM3 (ref 0–0.05)
IMM GRANULOCYTES NFR BLD AUTO: 0.9 % (ref 0–0.5)
IRON 24H UR-MRATE: 74 MCG/DL (ref 37–145)
IRON SATN MFR SERPL: 17 % (ref 20–50)
LYMPHOCYTES # BLD AUTO: 1.58 10*3/MM3 (ref 0.7–3.1)
LYMPHOCYTES NFR BLD AUTO: 28.2 % (ref 19.6–45.3)
MCH RBC QN AUTO: 27.8 PG (ref 26.6–33)
MCHC RBC AUTO-ENTMCNC: 31.3 G/DL (ref 31.5–35.7)
MCV RBC AUTO: 89 FL (ref 79–97)
MONOCYTES # BLD AUTO: 0.27 10*3/MM3 (ref 0.1–0.9)
MONOCYTES NFR BLD AUTO: 4.8 % (ref 5–12)
NEUTROPHILS NFR BLD AUTO: 3.64 10*3/MM3 (ref 1.7–7)
NEUTROPHILS NFR BLD AUTO: 64.9 % (ref 42.7–76)
NRBC BLD AUTO-RTO: 0 /100 WBC (ref 0–0.2)
PLATELET # BLD AUTO: 123 10*3/MM3 (ref 140–450)
PMV BLD AUTO: 13.8 FL (ref 6–12)
RBC # BLD AUTO: 4.53 10*6/MM3 (ref 3.77–5.28)
TIBC SERPL-MCNC: 437 MCG/DL (ref 298–536)
TRANSFERRIN SERPL-MCNC: 293 MG/DL (ref 200–360)
VIT B12 BLD-MCNC: 414 PG/ML (ref 211–946)
WBC # BLD AUTO: 5.61 10*3/MM3 (ref 3.4–10.8)

## 2021-09-16 PROCEDURE — 25010000002 CYANOCOBALAMIN PER 1000 MCG: Performed by: INTERNAL MEDICINE

## 2021-09-16 PROCEDURE — 99214 OFFICE O/P EST MOD 30 MIN: CPT | Performed by: INTERNAL MEDICINE

## 2021-09-16 PROCEDURE — 83540 ASSAY OF IRON: CPT | Performed by: INTERNAL MEDICINE

## 2021-09-16 PROCEDURE — 84466 ASSAY OF TRANSFERRIN: CPT | Performed by: INTERNAL MEDICINE

## 2021-09-16 PROCEDURE — 96372 THER/PROPH/DIAG INJ SC/IM: CPT

## 2021-09-16 PROCEDURE — 82607 VITAMIN B-12: CPT | Performed by: INTERNAL MEDICINE

## 2021-09-16 PROCEDURE — 36415 COLL VENOUS BLD VENIPUNCTURE: CPT

## 2021-09-16 PROCEDURE — 85025 COMPLETE CBC W/AUTO DIFF WBC: CPT | Performed by: INTERNAL MEDICINE

## 2021-09-16 PROCEDURE — 82728 ASSAY OF FERRITIN: CPT | Performed by: INTERNAL MEDICINE

## 2021-09-16 RX ORDER — CYANOCOBALAMIN 1000 UG/ML
1000 INJECTION, SOLUTION INTRAMUSCULAR; SUBCUTANEOUS ONCE
Status: COMPLETED | OUTPATIENT
Start: 2021-09-16 | End: 2021-09-16

## 2021-09-16 RX ADMIN — CYANOCOBALAMIN 1000 MCG: 1000 INJECTION, SOLUTION INTRAMUSCULAR at 11:34

## 2021-09-16 NOTE — PROGRESS NOTES
Subjective   Kathleen Claire is a 31 y.o. female.  Referred by  for evaluation of thrombocytopenia.      History of Present Illness    is a 30-year-old premenopausal  lady with longstanding history of thrombocytopenia.  She was diagnosed with thrombocytopenia prior to having her first child about 8 years ago.  She reports being placed on steroids recently around the time of delivery of her second child about 9 months ago.  At that point she was evaluated by Dr. Nehemias Carmen at [facility] who recommended steroids to help increase the platelet count.  100,000 to facilitate epidural.  She moved to [place] about a year ago from [place] where she reportedly had seen a hematologist and was diagnosed with ITP.  She denies being on any medications chronically except for levothyroxine, denies any new herbal medications or herbal teas.  On review of the labs she does have B12 deficiency with a B12 level at 100.  She has been on oral B12 replacement however she is has not taken that since she got pregnant with her second child.  She has never been on parenteral B12 replacement.  She denies any excessive bleeding or bruising.  Denies any risk factors for HIV or hepatitis B.  Denies any history of alcohol use.  She reports a well-rounded diet.   Review of labs over the past year showed platelet counts ranging from ,000.Iron studies performed November 2019 suggestive of iron deficiency with ferritin of 7 and iron saturation 19.  She reports excessive fatigue.  Denies any recent weight changes, no fevers, no chills, no new lymphadenopathy.    Interval history  Ms. Claire presents to the clinic today for follow-up.  She has been experiencing increased fatigue over the past few weeks and also reporting some pain in her neck and some myalgias.  She had a cousin who recently passed away in a road accident and she has been somewhat stressed over this.  She feels like most of her symptoms could be  related to that however wants to make sure that her recent fatigue is not secondary to B12 or iron deficiency.  She has been somewhat noncompliant with her oral iron as well as her B12.  She is having irregular menstrual cycles.  Reports a good diet.      The following portions of the patient's history were reviewed and updated as appropriate: allergies, current medications, past family history, past medical history, past social history, past surgical history and problem list.    No past medical history on file.     Past Surgical History:   Procedure Laterality Date   •  SECTION          No family history on file.     Social History     Socioeconomic History   • Marital status:      Spouse name: Not on file   • Number of children: Not on file   • Years of education: Not on file   • Highest education level: Not on file   Tobacco Use   • Smoking status: Never Smoker   • Smokeless tobacco: Never Used   Substance and Sexual Activity   • Alcohol use: Never   • Drug use: Never   • Sexual activity: Yes        OB History    No obstetric history on file.          No Known Allergies         Review of Systems   Constitutional: Positive for fatigue. Negative for activity change, appetite change, chills, diaphoresis, fever, unexpected weight gain and unexpected weight loss.   HENT: Negative for congestion, dental problem, drooling, ear discharge, ear pain, facial swelling, hearing loss, mouth sores, nosebleeds, postnasal drip, rhinorrhea, sinus pressure, sneezing, sore throat, swollen glands, tinnitus, trouble swallowing and voice change.    Eyes: Negative for blurred vision, double vision, photophobia, pain, discharge, redness, itching and visual disturbance.   Respiratory: Negative for apnea, cough, choking, chest tightness, shortness of breath, wheezing and stridor.    Cardiovascular: Negative for chest pain, palpitations and leg swelling.   Gastrointestinal: Negative for abdominal distention, abdominal pain,  anal bleeding, blood in stool, constipation, diarrhea, nausea, rectal pain, vomiting, GERD and indigestion.   Endocrine: Negative for cold intolerance, heat intolerance, polydipsia, polyphagia and polyuria.   Genitourinary: Negative for amenorrhea, breast discharge, breast lump, breast pain, decreased libido, decreased urine volume, difficulty urinating, dyspareunia, dysuria, flank pain, frequency, genital sores, hematuria, menstrual problem, pelvic pain, pelvic pressure, urgency, urinary incontinence, vaginal bleeding, vaginal discharge and vaginal pain.   Musculoskeletal: Negative for arthralgias, back pain, gait problem, joint swelling, myalgias, neck pain, neck stiffness and bursitis.   Skin: Negative for color change, dry skin, pallor, rash, skin lesions and bruise.   Allergic/Immunologic: Negative for environmental allergies, food allergies and immunocompromised state.   Neurological: Negative for dizziness, tremors, seizures, syncope, facial asymmetry, speech difficulty, weakness, light-headedness, numbness, headache, memory problem and confusion.   Hematological: Negative for adenopathy. Does not bruise/bleed easily.   Psychiatric/Behavioral: Negative for agitation, behavioral problems, decreased concentration, dysphoric mood, hallucinations, self-injury, sleep disturbance, suicidal ideas, negative for hyperactivity, depressed mood and stress. The patient is not nervous/anxious.      I have reviewed and confirmed the accuracy of the ROS as documented by the MA/LPN/RN Beatriz Valencia MD      Objective   not currently breastfeeding.   Physical Exam  Constitutional:       Appearance: Normal appearance. She is normal weight.   HENT:      Head: Normocephalic.      Right Ear: External ear normal.      Left Ear: External ear normal.      Nose: Nose normal. No congestion or rhinorrhea.      Mouth/Throat:      Mouth: Mucous membranes are moist.      Pharynx: Oropharynx is clear. No oropharyngeal exudate or posterior  oropharyngeal erythema.   Eyes:      General: No scleral icterus.        Right eye: No discharge.         Left eye: No discharge.      Extraocular Movements: Extraocular movements intact.      Conjunctiva/sclera: Conjunctivae normal.      Pupils: Pupils are equal, round, and reactive to light.   Neck:      Vascular: No carotid bruit.   Cardiovascular:      Rate and Rhythm: Normal rate and regular rhythm.      Heart sounds: No murmur heard.   No gallop.    Pulmonary:      Effort: Pulmonary effort is normal. No respiratory distress.      Breath sounds: No stridor. No wheezing, rhonchi or rales.   Chest:      Chest wall: No tenderness.   Abdominal:      General: Abdomen is flat. Bowel sounds are normal. There is no distension.      Palpations: Abdomen is soft. There is no mass.      Tenderness: There is no abdominal tenderness. There is no guarding or rebound.      Hernia: No hernia is present.   Musculoskeletal:         General: No swelling, tenderness, deformity or signs of injury. Normal range of motion.      Cervical back: Normal range of motion. No rigidity. No muscular tenderness.      Right lower leg: No edema.      Left lower leg: No edema.   Lymphadenopathy:      Cervical: No cervical adenopathy.   Skin:     General: Skin is warm and dry.      Coloration: Skin is not jaundiced or pale.      Findings: No bruising, erythema, lesion or rash.   Neurological:      General: No focal deficit present.      Mental Status: She is alert and oriented to person, place, and time. Mental status is at baseline.      Cranial Nerves: No cranial nerve deficit.      Sensory: No sensory deficit.      Motor: No weakness.      Coordination: Coordination normal.      Gait: Gait normal.      Deep Tendon Reflexes: Reflexes normal.   Psychiatric:         Mood and Affect: Mood normal.         Behavior: Behavior normal.         Thought Content: Thought content normal.         Judgment: Judgment normal.           Office Visit on 08/18/2021    Component Date Value Ref Range Status   • Total Cholesterol 08/18/2021 146  0 - 200 mg/dL Final   • Triglycerides 08/18/2021 89  0 - 150 mg/dL Final   • HDL Cholesterol 08/18/2021 43  40 - 60 mg/dL Final   • LDL Cholesterol  08/18/2021 86  0 - 100 mg/dL Final   • VLDL Cholesterol 08/18/2021 17  5 - 40 mg/dL Final   • LDL/HDL Ratio 08/18/2021 1.98   Final   • Methylmalonic Acid 08/18/2021 85  0 - 378 nmol/L Final   • Disclaimer: 08/18/2021 Comment   Final    This test was developed and its performance characteristics  determined by [employer] website. It has not been cleared or approved  by the Food and Drug Administration.        No radiology results for the last 30 days.     9/16/2021  CBC-WBC normal at 5.61, hemoglobin 12.6, platelets 123,000  Ferritin low at 38  Iron normal at 74, iron saturation low at 17%, transferrin 293, TIBC 437  Vitamin B12      Assessment/Plan   Diagnoses and all orders for this visit:    1. B12 deficiency (Primary)  -     CBC & Differential; Future  -     Ferritin; Future  -     Iron Profile; Future  -     Vitamin B12; Future       is a 31 yr old premenopausal lady with long standing thrombocytopenia here for evaluation of the thrombocytoepnia    1.Thrombocytopenia  · Secondary to ITP given elevated IPF  · Platelet count 123,000  · Improved compared to previous values  · Continue to monitor CBC  · No active bleeding or bruising    2.  B12 deficiency  · She had been compliant with B12 previously however in the past few weeks she has been noncompliant with her oral B12.  · She reports increased fatigue  · Follow-up on B12 levels  · She is scheduled for a subcutaneous B12 injection today  · If the B12 level is less than 300 then we will continue with B12 injections however if normal then she will resume oral B12 for replacement.    3.iron deficiency  · Hemoglobin normal  · Iron saturation low at 17%  · Ferritin low at 38  · Recommend ferrous sulfate 325 mg every other day  · She has tolerated  oral iron fairly well in the past.    4.fatigue  · TSH checked in August and normal  · Could be secondary to ongoing stress or B12 deficiency  · Follow-up on B12 level    5.hypothyroidism  · Continue levothyroxine    6.  3 months with CBC, B12 and iron studies    Addendum  Vitamin B12 reviewed and normal at 414.  She can receive the B12 injection today but subsequently she can continue with oral B12 replacement.  Patient will be called with the results.

## 2021-09-16 NOTE — TELEPHONE ENCOUNTER
Left message on pt voicemail, notified her of normal b12 level and to continue with oral b12. I left my direct number to return my call should she have any questions.

## 2021-09-21 DIAGNOSIS — E03.9 ACQUIRED HYPOTHYROIDISM: ICD-10-CM

## 2021-09-21 RX ORDER — LEVOTHYROXINE SODIUM 0.05 MG/1
50 TABLET ORAL DAILY
Qty: 90 TABLET | Refills: 1 | Status: SHIPPED | OUTPATIENT
Start: 2021-09-21 | End: 2022-07-25

## 2021-09-21 NOTE — TELEPHONE ENCOUNTER
Fax request for new rx to [facility]. Last visit 8/18/21, no follow up scheduled.

## 2021-10-24 NOTE — TELEPHONE ENCOUNTER
10/24/21                            Emma Alvarez  [address]  [place] [address]    To Whom It May Concern:    This is to certify Emma Alvarez was evaluated with Nay Ayala PA-C on 10/24/21.              Nay Ayala PA-C  [facility]   [address] [place] [address]  Phone: [phone]  Fax: [phone]     ----- Message from Beatriz Valencia MD sent at 4/15/2021 10:50 AM EDT -----  Please call and inform the patient that her B12 level is still low at 198.She has only been taking 500 mcg of vitamin B12.  Increase was 2000 mcg and if it still remains low we will have to start her on parenteral supplementation.  Recheck B12 in 1 month

## 2021-11-30 ENCOUNTER — TELEPHONE (OUTPATIENT)
Dept: INTERNAL MEDICINE | Facility: CLINIC | Age: 31
End: 2021-11-30

## 2021-11-30 NOTE — TELEPHONE ENCOUNTER
Caller: Kiana Claire     Relationship:      Best call back number: [phone]     What form or medical record are you requesting: NOTE STATING THAT PATIENT HAS HAD ANNUAL WELLNESS EXAM WITH [facility] THIS YEAR      Who is requesting this form or medical record from you: EMPLOYER      How would you like to receive the form or medical records (pick-up, mail, fax):If pick-up, provide patient with address and location details  FAX: [phone]

## 2021-12-09 NOTE — TELEPHONE ENCOUNTER
PATIENTS  ELMER IS CALLING IN STATING THAT THE PATIENT NEEDS A LETTER FROM DR BARRETT STATING THAT SHE HAS HAD A PHYSICAL.  THIS NEEDS TO BE TURNED IN BY TOMORROW.  IT CAN BE FAXED  [phone]    ELMER CALL BACK  [phone]

## 2022-03-01 ENCOUNTER — OFFICE VISIT (OUTPATIENT)
Dept: INTERNAL MEDICINE | Facility: CLINIC | Age: 32
End: 2022-03-01

## 2022-03-01 ENCOUNTER — HOSPITAL ENCOUNTER (OUTPATIENT)
Dept: GENERAL RADIOLOGY | Facility: HOSPITAL | Age: 32
Discharge: HOME | End: 2022-03-01

## 2022-03-01 VITALS
BODY MASS INDEX: 28.49 KG/M2 | HEART RATE: 88 BPM | DIASTOLIC BLOOD PRESSURE: 60 MMHG | OXYGEN SATURATION: 98 % | WEIGHT: 160.8 LBS | SYSTOLIC BLOOD PRESSURE: 114 MMHG | HEIGHT: 63 IN | TEMPERATURE: 97.5 F

## 2022-03-01 DIAGNOSIS — R07.9 CHEST PAIN, UNSPECIFIED TYPE: Primary | ICD-10-CM

## 2022-03-01 DIAGNOSIS — R07.9 CHEST PAIN, UNSPECIFIED TYPE: ICD-10-CM

## 2022-03-01 DIAGNOSIS — K52.9 GASTROENTERITIS: ICD-10-CM

## 2022-03-01 DIAGNOSIS — K21.9 GASTROESOPHAGEAL REFLUX DISEASE WITHOUT ESOPHAGITIS: ICD-10-CM

## 2022-03-01 PROCEDURE — 99213 OFFICE O/P EST LOW 20 MIN: CPT | Performed by: FAMILY MEDICINE

## 2022-03-01 RX ORDER — FAMOTIDINE 20 MG/1
20 TABLET, FILM COATED ORAL 2 TIMES DAILY
Qty: 60 TABLET | Refills: 1 | Status: SHIPPED | OUTPATIENT
Start: 2022-03-01 | End: 2022-09-12

## 2022-03-01 NOTE — PROGRESS NOTES
"Chief Complaint  Vomiting (since yesterday), Abdominal Pain, Headache, and chest pain when turning over in bed like something is broken    Subjective          Kathleen Claire presents to [facility]  History of Present Illness  Patient appointment to discuss acute problem she feels there is a abnormality on her sternum when she moves she feels crunching points to the manubrium sternal joint she is also concerned that this is been going on for couple weeks or so more aggravated when she rolls over in bed  Yesterday she had episode of vomiting today is much improved there has been other family members with similar gastrointestinal illnesses she has no fever no sweats or chills no diarrhea  She has concerned because in the past she has taken some heartburn medicine and feels that that has been recently becoming more aggravated prior to the most recent acute illness  Objective   Vital Signs:   /60 (BP Location: Left arm, Patient Position: Sitting, Cuff Size: Adult)   Pulse 88   Temp 97.5 °F (36.4 °C) (Infrared)   Ht 160 cm (62.99\")   Wt 72.9 kg (160 lb 12.8 oz)   SpO2 98%   BMI 28.49 kg/m²     Physical Exam  Vitals and nursing note reviewed.   Constitutional:       Appearance: Normal appearance.   HENT:      Head: Normocephalic and atraumatic.      Right Ear: Tympanic membrane, ear canal and external ear normal.      Left Ear: Tympanic membrane, ear canal and external ear normal.   Eyes:      General: No scleral icterus.  Cardiovascular:      Rate and Rhythm: Normal rate and regular rhythm.      Pulses: Normal pulses.      Heart sounds: Normal heart sounds.   Pulmonary:      Effort: Pulmonary effort is normal.      Breath sounds: Normal breath sounds.   Chest:      Chest wall: Tenderness present. No crepitus.       Abdominal:      General: Bowel sounds are normal.      Palpations: Abdomen is soft.      Tenderness: There is no abdominal tenderness. There is no right CVA tenderness, " left CVA tenderness, guarding or rebound.   Neurological:      Mental Status: She is alert.   Psychiatric:         Mood and Affect: Mood normal.         Behavior: Behavior normal.         Thought Content: Thought content normal.         Judgment: Judgment normal.        Result Review :                 Assessment and Plan    Diagnoses and all orders for this visit:    1. Chest pain, unspecified type (Primary)  -     XR sternum pa and lateral; Future    2. Gastroesophageal reflux disease without esophagitis  -     famotidine (Pepcid) 20 MG tablet; Take 1 tablet by mouth 2 (Two) Times a Day.  Dispense: 60 tablet; Refill: 1    3. Gastroenteritis    Resolving gastroenteritis follow-up results of x-ray as well as trial of Pepcid or if no improvement otherwise    Follow Up   Return in about 1 month (around 4/1/2022), or if symptoms worsen or fail to improve, for Recheck.  Patient was given instructions and counseling regarding her condition or for health maintenance advice. Please see specific information pulled into the AVS if appropriate.

## 2022-03-04 ENCOUNTER — HOSPITAL ENCOUNTER (OUTPATIENT)
Dept: GENERAL RADIOLOGY | Facility: HOSPITAL | Age: 32
Discharge: HOME | End: 2022-03-04
Admitting: FAMILY MEDICINE

## 2022-03-04 PROCEDURE — 71120 X-RAY EXAM BREASTBONE 2/>VWS: CPT

## 2022-04-08 ENCOUNTER — TELEPHONE (OUTPATIENT)
Dept: ONCOLOGY | Facility: CLINIC | Age: 32
End: 2022-04-08

## 2022-04-08 NOTE — TELEPHONE ENCOUNTER
----- Message from Harmony Gonsales RN sent at 4/8/2022 11:45 AM EDT -----  Regarding: Appointment  Patient called needing to schedule a appointment with Dr Valencia. Thanks

## 2022-04-14 ENCOUNTER — LAB (OUTPATIENT)
Dept: OTHER | Facility: HOSPITAL | Age: 32
End: 2022-04-14

## 2022-04-14 ENCOUNTER — OFFICE VISIT (OUTPATIENT)
Dept: ONCOLOGY | Facility: CLINIC | Age: 32
End: 2022-04-14

## 2022-04-14 VITALS
DIASTOLIC BLOOD PRESSURE: 64 MMHG | TEMPERATURE: 97.1 F | OXYGEN SATURATION: 98 % | HEIGHT: 63 IN | BODY MASS INDEX: 28.7 KG/M2 | SYSTOLIC BLOOD PRESSURE: 105 MMHG | HEART RATE: 76 BPM | RESPIRATION RATE: 18 BRPM | WEIGHT: 162 LBS

## 2022-04-14 DIAGNOSIS — D51.8 OTHER VITAMIN B12 DEFICIENCY ANEMIA: ICD-10-CM

## 2022-04-14 DIAGNOSIS — D69.6 THROMBOCYTOPENIA: ICD-10-CM

## 2022-04-14 DIAGNOSIS — E53.8 B12 DEFICIENCY: Primary | ICD-10-CM

## 2022-04-14 LAB
BASOPHILS # BLD AUTO: 0.02 10*3/MM3 (ref 0–0.2)
BASOPHILS NFR BLD AUTO: 0.4 % (ref 0–1.5)
DEPRECATED RDW RBC AUTO: 41.1 FL (ref 37–54)
EOSINOPHIL # BLD AUTO: 0.05 10*3/MM3 (ref 0–0.4)
EOSINOPHIL NFR BLD AUTO: 1.1 % (ref 0.3–6.2)
ERYTHROCYTE [DISTWIDTH] IN BLOOD BY AUTOMATED COUNT: 12.6 % (ref 12.3–15.4)
FERRITIN SERPL-MCNC: 92.5 NG/ML (ref 13–150)
HCT VFR BLD AUTO: 39 % (ref 34–46.6)
HGB BLD-MCNC: 12.4 G/DL (ref 12–15.9)
IMM GRANULOCYTES # BLD AUTO: 0 10*3/MM3 (ref 0–0.05)
IMM GRANULOCYTES NFR BLD AUTO: 0 % (ref 0–0.5)
IRON 24H UR-MRATE: 110 MCG/DL (ref 37–145)
IRON SATN MFR SERPL: 27 % (ref 20–50)
LYMPHOCYTES # BLD AUTO: 1.65 10*3/MM3 (ref 0.7–3.1)
LYMPHOCYTES NFR BLD AUTO: 36.4 % (ref 19.6–45.3)
MCH RBC QN AUTO: 28.1 PG (ref 26.6–33)
MCHC RBC AUTO-ENTMCNC: 31.8 G/DL (ref 31.5–35.7)
MCV RBC AUTO: 88.2 FL (ref 79–97)
MONOCYTES # BLD AUTO: 0.26 10*3/MM3 (ref 0.1–0.9)
MONOCYTES NFR BLD AUTO: 5.7 % (ref 5–12)
NEUTROPHILS NFR BLD AUTO: 2.55 10*3/MM3 (ref 1.7–7)
NEUTROPHILS NFR BLD AUTO: 56.4 % (ref 42.7–76)
NRBC BLD AUTO-RTO: 0 /100 WBC (ref 0–0.2)
PLATELET # BLD AUTO: 136 10*3/MM3 (ref 140–450)
PMV BLD AUTO: 13.3 FL (ref 6–12)
RBC # BLD AUTO: 4.42 10*6/MM3 (ref 3.77–5.28)
TIBC SERPL-MCNC: 402 MCG/DL (ref 298–536)
TRANSFERRIN SERPL-MCNC: 270 MG/DL (ref 200–360)
VIT B12 BLD-MCNC: 312 PG/ML (ref 211–946)
WBC NRBC COR # BLD: 4.53 10*3/MM3 (ref 3.4–10.8)

## 2022-04-14 PROCEDURE — 85025 COMPLETE CBC W/AUTO DIFF WBC: CPT | Performed by: NURSE PRACTITIONER

## 2022-04-14 PROCEDURE — 82607 VITAMIN B-12: CPT | Performed by: NURSE PRACTITIONER

## 2022-04-14 PROCEDURE — 99214 OFFICE O/P EST MOD 30 MIN: CPT | Performed by: INTERNAL MEDICINE

## 2022-04-14 PROCEDURE — 82728 ASSAY OF FERRITIN: CPT | Performed by: NURSE PRACTITIONER

## 2022-04-14 PROCEDURE — 36415 COLL VENOUS BLD VENIPUNCTURE: CPT

## 2022-04-14 PROCEDURE — 83540 ASSAY OF IRON: CPT | Performed by: NURSE PRACTITIONER

## 2022-04-14 PROCEDURE — 84466 ASSAY OF TRANSFERRIN: CPT | Performed by: NURSE PRACTITIONER

## 2022-04-14 RX ORDER — UBIDECARENONE 75 MG
50 CAPSULE ORAL DAILY
COMMUNITY
End: 2022-04-14 | Stop reason: SDUPTHER

## 2022-04-14 RX ORDER — LANOLIN ALCOHOL/MO/W.PET/CERES
1000 CREAM (GRAM) TOPICAL DAILY
Qty: 90 TABLET | Refills: 3 | Status: SHIPPED | OUTPATIENT
Start: 2022-04-14

## 2022-04-14 RX ORDER — FERROUS SULFATE 325(65) MG
325 TABLET ORAL EVERY OTHER DAY
Qty: 90 TABLET | Refills: 3 | Status: SHIPPED | OUTPATIENT
Start: 2022-04-14

## 2022-04-14 RX ORDER — MEDROXYPROGESTERONE ACETATE 10 MG/1
10 TABLET ORAL DAILY
COMMUNITY
Start: 2022-04-11 | End: 2022-11-11

## 2022-04-14 NOTE — PROGRESS NOTES
Subjective   Kathleen Claire is a 32 y.o. female.  Referred by  for evaluation of thrombocytopenia.      History of Present Illness    is a 30-year-old premenopausal  lady with longstanding history of thrombocytopenia.  She was diagnosed with thrombocytopenia prior to having her first child about 8 years ago.  She reports being placed on steroids recently around the time of delivery of her second child about 9 months ago.  At that point she was evaluated by Dr. Nehemias Carmen at [facility] who recommended steroids to help increase the platelet count.  100,000 to facilitate epidural.  She moved to [place] about a year ago from [place] where she reportedly had seen a hematologist and was diagnosed with ITP.  She denies being on any medications chronically except for levothyroxine, denies any new herbal medications or herbal teas.  On review of the labs she does have B12 deficiency with a B12 level at 100.  She has been on oral B12 replacement however she is has not taken that since she got pregnant with her second child.  She has never been on parenteral B12 replacement.  She denies any excessive bleeding or bruising.  Denies any risk factors for HIV or hepatitis B.  Denies any history of alcohol use.  She reports a well-rounded diet.   Review of labs over the past year showed platelet counts ranging from ,000.Iron studies performed November 2019 suggestive of iron deficiency with ferritin of 7 and iron saturation 19.  She reports excessive fatigue.  Denies any recent weight changes, no fevers, no chills, no new lymphadenopathy.    Interval history  Ms. Claire presents to the clinic today for follow-up.  She and her entire family have been sick over the past 3 weeks.  They had flu.  Fatigue is increased since her sickness but prior to that she was doing reasonably well.  She is requesting refills on oral iron as well as B12 for 90-day supply.  She plans to go to [facility] in May and return  in August.  Denies any aches or bleeding or bruising.  She has been trying to watch her diet and exercise regularly.  She has been having irregular menstrual cycles.      The following portions of the patient's history were reviewed and updated as appropriate: allergies, current medications, past family history, past medical history, past social history, past surgical history and problem list.    No past medical history on file.     Past Surgical History:   Procedure Laterality Date   •  SECTION          No family history on file.     Social History     Socioeconomic History   • Marital status:    Tobacco Use   • Smoking status: Never Smoker   • Smokeless tobacco: Never Used   Substance and Sexual Activity   • Alcohol use: Never   • Drug use: Never   • Sexual activity: Yes        OB History    No obstetric history on file.          No Known Allergies         Review of Systems   Constitutional: Positive for fatigue. Negative for activity change, appetite change, chills, diaphoresis, fever, unexpected weight gain and unexpected weight loss.   HENT: Negative for congestion, dental problem, drooling, ear discharge, ear pain, facial swelling, hearing loss, mouth sores, nosebleeds, postnasal drip, rhinorrhea, sinus pressure, sneezing, sore throat, swollen glands, tinnitus, trouble swallowing and voice change.    Eyes: Negative for blurred vision, double vision, photophobia, pain, discharge, redness, itching and visual disturbance.   Respiratory: Negative for apnea, cough, choking, chest tightness, shortness of breath, wheezing and stridor.    Cardiovascular: Negative for chest pain, palpitations and leg swelling.   Gastrointestinal: Negative for abdominal distention, abdominal pain, anal bleeding, blood in stool, constipation, diarrhea, nausea, rectal pain, vomiting, GERD and indigestion.   Endocrine: Negative for cold intolerance, heat intolerance, polydipsia, polyphagia and polyuria.   Genitourinary:  "Negative for amenorrhea, breast discharge, breast lump, breast pain, decreased libido, decreased urine volume, difficulty urinating, dyspareunia, dysuria, flank pain, frequency, genital sores, hematuria, menstrual problem, pelvic pain, pelvic pressure, urgency, urinary incontinence, vaginal bleeding, vaginal discharge and vaginal pain.   Musculoskeletal: Negative for arthralgias, back pain, gait problem, joint swelling, myalgias, neck pain, neck stiffness and bursitis.   Skin: Negative for color change, dry skin, pallor, rash, skin lesions and wound.   Allergic/Immunologic: Negative for environmental allergies, food allergies and immunocompromised state.   Neurological: Negative for dizziness, tremors, seizures, syncope, facial asymmetry, speech difficulty, weakness, light-headedness, numbness, headache, memory problem and confusion.   Hematological: Negative for adenopathy. Does not bruise/bleed easily.   Psychiatric/Behavioral: Negative for agitation, behavioral problems, decreased concentration, dysphoric mood, hallucinations, self-injury, sleep disturbance, suicidal ideas, negative for hyperactivity, depressed mood and stress. The patient is not nervous/anxious.      Review of systems mentioned in HPI    Objective   Blood pressure 105/64, pulse 76, temperature 97.1 °F (36.2 °C), temperature source Temporal, resp. rate 18, height 160 cm (62.99\"), weight 73.5 kg (162 lb), SpO2 98 %, not currently breastfeeding.   Physical Exam  Constitutional:       Appearance: Normal appearance. She is normal weight.   HENT:      Head: Normocephalic.      Right Ear: External ear normal.      Left Ear: External ear normal.      Nose: Nose normal. No congestion or rhinorrhea.      Mouth/Throat:      Mouth: Mucous membranes are moist.      Pharynx: Oropharynx is clear. No oropharyngeal exudate or posterior oropharyngeal erythema.   Eyes:      General: No scleral icterus.        Right eye: No discharge.         Left eye: No " discharge.      Extraocular Movements: Extraocular movements intact.      Conjunctiva/sclera: Conjunctivae normal.      Pupils: Pupils are equal, round, and reactive to light.   Neck:      Vascular: No carotid bruit.   Cardiovascular:      Rate and Rhythm: Normal rate and regular rhythm.      Heart sounds: No murmur heard.    No gallop.   Pulmonary:      Effort: Pulmonary effort is normal. No respiratory distress.      Breath sounds: No stridor. No wheezing, rhonchi or rales.   Chest:      Chest wall: No tenderness.   Abdominal:      General: Abdomen is flat. Bowel sounds are normal. There is no distension.      Palpations: Abdomen is soft. There is no mass.      Tenderness: There is no abdominal tenderness. There is no guarding or rebound.      Hernia: No hernia is present.   Musculoskeletal:         General: No swelling, tenderness, deformity or signs of injury. Normal range of motion.      Cervical back: Normal range of motion. No rigidity. No muscular tenderness.      Right lower leg: No edema.      Left lower leg: No edema.   Lymphadenopathy:      Cervical: No cervical adenopathy.   Skin:     General: Skin is warm and dry.      Coloration: Skin is not jaundiced or pale.      Findings: No bruising, erythema, lesion or rash.   Neurological:      General: No focal deficit present.      Mental Status: She is alert and oriented to person, place, and time. Mental status is at baseline.      Cranial Nerves: No cranial nerve deficit.      Sensory: No sensory deficit.      Motor: No weakness.      Coordination: Coordination normal.      Gait: Gait normal.      Deep Tendon Reflexes: Reflexes normal.   Psychiatric:         Mood and Affect: Mood normal.         Behavior: Behavior normal.         Thought Content: Thought content normal.         Judgment: Judgment normal.       I have reexamined the patient and the results are consistent with the previously documented exam. Beatriz Valencia MD     Lab on 04/14/2022    Component Date Value Ref Range Status   • WBC 2022 4.53  3.40 - 10.80 10*3/mm3 Final   • RBC 2022 4.42  3.77 - 5.28 10*6/mm3 Final   • Hemoglobin 2022 12.4  12.0 - 15.9 g/dL Final   • Hematocrit 2022 39.0  34.0 - 46.6 % Final   • MCV 2022 88.2  79.0 - 97.0 fL Final   • MCH 2022 28.1  26.6 - 33.0 pg Final   • MCHC 2022 31.8  31.5 - 35.7 g/dL Final   • RDW 2022 12.6  12.3 - 15.4 % Final   • RDW-SD 2022 41.1  37.0 - 54.0 fl Final   • MPV 2022 13.3 (A) 6.0 - 12.0 fL Final   • Platelets 2022 136 (A) 140 - 450 10*3/mm3 Final   • Neutrophil % 2022 56.4  42.7 - 76.0 % Final   • Lymphocyte % 2022 36.4  19.6 - 45.3 % Final   • Monocyte % 2022 5.7  5.0 - 12.0 % Final   • Eosinophil % 2022 1.1  0.3 - 6.2 % Final   • Basophil % 2022 0.4  0.0 - 1.5 % Final   • Immature Grans % 2022 0.0  0.0 - 0.5 % Final   • Neutrophils, Absolute 2022 2.55  1.70 - 7.00 10*3/mm3 Final   • Lymphocytes, Absolute 2022 1.65  0.70 - 3.10 10*3/mm3 Final   • Monocytes, Absolute 2022 0.26  0.10 - 0.90 10*3/mm3 Final   • Eosinophils, Absolute 2022 0.05  0.00 - 0.40 10*3/mm3 Final   • Basophils, Absolute 2022 0.02  0.00 - 0.20 10*3/mm3 Final   • Immature Grans, Absolute 2022 0.00  0.00 - 0.05 10*3/mm3 Final   • nRBC 2022 0.0  0.0 - 0.2 /100 WBC Final        US Pelvic, Non OB, Transvaginal Only    Result Date: 4/10/2022  in office REVIEWING YOUR TEST RESULTS IN [facility] IS NOT A SUBSTITUTE FOR DISCUSSING THOSE RESULTS WITH YOUR HEALTH CARE PROVIDER. PLEASE CONTACT YOUR PROVIDER VIA [facility] TO DISCUSS ANY QUESTIONS OR CONCERNS YOU MAY HAVE REGARDING THESE TEST RESULTS. Gynecological Report               RADIOLOGY REPORT    FACILITY:  [facility] ROOM NUMBER:   PATIENT NAME/:  JULIET ATKINSON R    1990 MRN NUMBER:  [id number] ACCOUNT NUMBER:  [id number] ACCESSION  NUMBER:  [id number]    DATE OF EXAM:  04/08/2022 EXAMINATION(S):  US PELVIC, NON OB, TRANSVAGINAL ONLY       PERFORMED BY     Performed By:     Ailyn Tang RDMS  Attending:        Shira Evangelista MD  Location:         [facility] ---------------------------------------------------------------------- SERVICE(S) PROVIDED      US PELVIC, NON OB, TRANSVAGINAL ONLY                 US#[id number]  ---------------------------------------------------------------------- INDICATIONS      Amenorrhea  ---------------------------------------------------------------------- TECHNIQUE/SCAN QUALITY     Technique:      Transvaginal imaging was utilized to                  obtain finer detail. ---------------------------------------------------------------------- HISTORY     Age:   32 ---------------------------------------------------------------------- UTERUS     Uterus:     Visualized  Position:   Retroverted  Cervix Length:   3.47  cm  Size (cm)    L:  7.43      W:   6          H:  4.58  Vol (ml):    57  Description:   Normal appearance ---------------------------------------------------------------------- ENDOMETRIUM     Endometrium:          Normal appearance  Thickness(mm):        7.45    ---------------------------------------------------------------------- CERVIX     There is fluid within the HUNTER/Cervical canal. ---------------------------------------------------------------------- CUL-DE-SAC     No fluid was visualized ---------------------------------------------------------------------- RIGHT OVARY     Status:   Visualized  Size (cm)    L:  4.91      W:   2.12       H:  1.82  Vol (ml):    9.9  Morphology:    Polycystic ---------------------------------------------------------------------- LEFT OVARY     Status:   Visualized  Size (cm)    L:  4.05      W:   2.45       H:  2.35  Vol (ml):    12.2  Morphology:    Polycystic ---------------------------------------------------------------------- COMMENTS   "   Retroverted UT with Normal Contour  No Fibroids noted at this time  EML= 7.45  RT ovary Unremarkable  LT ovary Unremarkable  No FF or adnexal masses noted at this time     The bilateral ovaries have multiple follicles surrounding  the periphery, giving the \"string of pearls\" appearance;  this is suggestive of PCOS.     This exam should be considered preliminary until  viewed and signed by a Physician ----------------------------------------------------------------------        Ailyn Tang RDMS          Reviewed by: Shira Evangelista MD    <AS><Preliminary - Signed Copy is Final>           4/14/2022 CBC reviewed and WBC 4.53, hemoglobin 12.4, platelet 136,000  Iron studies revealed an iron saturation 27%, TIBC 4 2  Ferritin normal at 92.5  B12 pending      Assessment/Plan   There are no diagnoses linked to this encounter.   is a 32 yr old premenopausal lady with long standing thrombocytopenia here for evaluation of the thrombocytoepnia    1.Thrombocytopenia  · Secondary to ITP given elevated IPF  · Platelet count better at 136,000  · Continue B12 supplementation    2.  B12 deficiency  · She had been compliant with B12 previously however in the past few weeks she has been noncompliant with her oral B12.  · She reports increased fatigue  · B12 levels pending today  · Continue with oral B12 supplementation.    3.iron deficiency  · Hemoglobin normal at 12.4  · Iron saturation normal at 27%  · TIBC normal  · Ferritin normal at 92.5  · Continue oral iron supplementation    4.fatigue  · Did improve with B12 and iron supplementation however has been worse over the past 3 to 4 weeks due to viral illness      5.hypothyroidism  · Continue levothyroxine    6.  6 months with CBC, B12, iron studies                     "

## 2022-07-23 DIAGNOSIS — E03.9 ACQUIRED HYPOTHYROIDISM: ICD-10-CM

## 2022-07-25 RX ORDER — LEVOTHYROXINE SODIUM 0.05 MG/1
TABLET ORAL
Qty: 90 TABLET | Refills: 0 | Status: SHIPPED | OUTPATIENT
Start: 2022-07-25 | End: 2022-11-21

## 2022-09-12 ENCOUNTER — OFFICE VISIT (OUTPATIENT)
Dept: INTERNAL MEDICINE | Facility: CLINIC | Age: 32
End: 2022-09-12

## 2022-09-12 VITALS
WEIGHT: 167.9 LBS | HEIGHT: 63 IN | OXYGEN SATURATION: 99 % | DIASTOLIC BLOOD PRESSURE: 66 MMHG | SYSTOLIC BLOOD PRESSURE: 114 MMHG | HEART RATE: 66 BPM | BODY MASS INDEX: 29.75 KG/M2

## 2022-09-12 DIAGNOSIS — R21 SKIN RASH: Primary | ICD-10-CM

## 2022-09-12 DIAGNOSIS — M54.2 NECK PAIN ON LEFT SIDE: ICD-10-CM

## 2022-09-12 PROCEDURE — 99213 OFFICE O/P EST LOW 20 MIN: CPT | Performed by: FAMILY MEDICINE

## 2022-09-12 RX ORDER — TIZANIDINE 2 MG/1
2 TABLET ORAL EVERY 8 HOURS PRN
Qty: 30 TABLET | Refills: 1 | Status: SHIPPED | OUTPATIENT
Start: 2022-09-12 | End: 2022-11-11

## 2022-09-12 NOTE — PROGRESS NOTES
"Chief Complaint  left side head pain (For about 3 days) and pain behind left ear    Subjective        Kathleen Claire presents to [facility]  History of Present Illness  Patient appointment for acute problem of neck pain more on the left than the right shooting from the shoulder up posteriorly and to the temporoparietal left side of the skull she did have a bug in her ear when she went to [facility] and she had difficulty getting out she wants to confirm that there is nothing residing in her ear at this time.  She has a secondary focus of pain of the superior to the left flank no worsening pain with deep breathing  She also has developed a rash in her back bilaterally it is intermittently itchy she has not used any specific treatment for it    Objective   Vital Signs:  /66 (BP Location: Right arm, Patient Position: Sitting, Cuff Size: Adult)   Pulse 66   Ht 160 cm (62.99\")   Wt 76.2 kg (167 lb 14.4 oz)   SpO2 99%   BMI 29.75 kg/m²   Estimated body mass index is 29.75 kg/m² as calculated from the following:    Height as of this encounter: 160 cm (62.99\").    Weight as of this encounter: 76.2 kg (167 lb 14.4 oz).          Physical Exam  Constitutional:       Appearance: Normal appearance. She is normal weight.   HENT:      Right Ear: Tympanic membrane normal.      Left Ear: Tympanic membrane, ear canal and external ear normal.   Cardiovascular:      Rate and Rhythm: Normal rate and regular rhythm.   Abdominal:      Tenderness: There is no right CVA tenderness or left CVA tenderness.   Musculoskeletal:         General: No swelling or tenderness.      Cervical back: Normal range of motion and neck supple. No rigidity or tenderness.   Lymphadenopathy:      Cervical: No cervical adenopathy.   Neurological:      Mental Status: She is alert.   Psychiatric:         Mood and Affect: Mood normal.         Behavior: Behavior normal.         Thought Content: Thought content normal.         " Judgment: Judgment normal.        Result Review :                Assessment and Plan   Diagnoses and all orders for this visit:    1. Skin rash (Primary)  -     Ambulatory Referral to Dermatology    2. Neck pain on left side    Other orders  -     tiZANidine (ZANAFLEX) 2 MG tablet; Take 1 tablet by mouth Every 8 (Eight) Hours As Needed for Muscle Spasms.  Dispense: 30 tablet; Refill: 1             Follow Up   Return in about 3 months (around 12/12/2022) for Recheck.  Patient was given instructions and counseling regarding her condition or for health maintenance advice. Please see specific information pulled into the AVS if appropriate.

## 2022-10-13 ENCOUNTER — APPOINTMENT (OUTPATIENT)
Dept: OTHER | Facility: HOSPITAL | Age: 32
End: 2022-10-13

## 2022-11-11 ENCOUNTER — OFFICE VISIT (OUTPATIENT)
Dept: INTERNAL MEDICINE | Facility: CLINIC | Age: 32
End: 2022-11-11

## 2022-11-11 VITALS
WEIGHT: 168.4 LBS | HEART RATE: 72 BPM | HEIGHT: 63 IN | OXYGEN SATURATION: 100 % | BODY MASS INDEX: 29.84 KG/M2 | SYSTOLIC BLOOD PRESSURE: 106 MMHG | DIASTOLIC BLOOD PRESSURE: 66 MMHG

## 2022-11-11 DIAGNOSIS — E03.9 ACQUIRED HYPOTHYROIDISM: ICD-10-CM

## 2022-11-11 DIAGNOSIS — Z00.00 HEALTHCARE MAINTENANCE: Primary | ICD-10-CM

## 2022-11-11 PROCEDURE — 99395 PREV VISIT EST AGE 18-39: CPT | Performed by: FAMILY MEDICINE

## 2022-11-11 RX ORDER — KETOCONAZOLE 20 MG/ML
1 SHAMPOO TOPICAL DAILY
COMMUNITY
Start: 2022-11-08

## 2022-11-11 NOTE — PROGRESS NOTES
Subjective   Kathleen Claire is a 32 y.o. female.     Chief Complaint   Patient presents with   • Annual Exam     Health maintenance    History of Present Illness   Kathleen Claire 32 y.o. female who presents for an Annual Wellness Visit.  she has a history of   Patient Active Problem List   Diagnosis   • Acquired hypothyroidism   • Healthcare maintenance   • Overweight (BMI 25.0-29.9)   • Thrombocytopenia (HCC)   • Gastroesophageal reflux disease with esophagitis without hemorrhage   • Neck pain on left side   • Anemia due to vitamin B12 deficiency   • Chest pain   • Gastroesophageal reflux disease without esophagitis   • Gastroenteritis   .  she has been feeling well.   I  reviewed health maintenance with her as part of my preventative care plan.  Recommend regular dental and eye exams  The following portions of the patient's history were reviewed and updated as appropriate: allergies, current medications, past family history, past medical history, past social history, past surgical history and problem list.    Review of Systems   Constitutional: Negative for appetite change, fever and unexpected weight change.   HENT: Negative for ear pain, facial swelling and sore throat.    Eyes: Negative for pain and visual disturbance.   Respiratory: Negative for chest tightness, shortness of breath and wheezing.    Cardiovascular: Negative for chest pain and palpitations.   Gastrointestinal: Negative for abdominal pain and blood in stool.   Endocrine: Negative.    Genitourinary: Negative for difficulty urinating and hematuria.   Musculoskeletal: Negative for joint swelling.   Neurological: Negative for tremors, seizures and syncope.   Hematological: Negative for adenopathy.   Psychiatric/Behavioral: Negative.        Objective   Physical Exam  Vitals and nursing note reviewed.   Constitutional:       Appearance: Normal appearance. She is well-developed. She is not diaphoretic.   HENT:      Head: Normocephalic and atraumatic.       Right Ear: Tympanic membrane, ear canal and external ear normal.      Left Ear: Tympanic membrane, ear canal and external ear normal.   Eyes:      General: Lids are normal. No scleral icterus.     Extraocular Movements: Extraocular movements intact.      Conjunctiva/sclera: Conjunctivae normal.   Neck:      Thyroid: No thyroid mass or thyromegaly.      Vascular: No carotid bruit or JVD.   Cardiovascular:      Rate and Rhythm: Normal rate and regular rhythm.      Pulses: Normal pulses.           Radial pulses are 2+ on the right side and 2+ on the left side.      Heart sounds: Normal heart sounds. No murmur heard.  Pulmonary:      Effort: Pulmonary effort is normal. No respiratory distress.      Breath sounds: Normal breath sounds.   Abdominal:      Palpations: Abdomen is soft.   Musculoskeletal:      Cervical back: Normal range of motion.      Right lower leg: No edema.      Left lower leg: No edema.   Skin:     General: Skin is warm and dry.      Coloration: Skin is not pale.      Findings: No erythema or rash.   Neurological:      General: No focal deficit present.      Mental Status: She is alert and oriented to person, place, and time.      Sensory: No sensory deficit.      Deep Tendon Reflexes: Reflexes are normal and symmetric.   Psychiatric:         Mood and Affect: Mood normal.         Behavior: Behavior normal. Behavior is cooperative.         Thought Content: Thought content normal.         Judgment: Judgment normal.         Assessment & Plan   Diagnoses and all orders for this visit:    1. Healthcare maintenance (Primary)  -     Cancel: CBC & Differential; Future  -     Cancel: Lipid Panel; Future  -     Cancel: TSH; Future  -     Cancel: Iron Profile; Future  -     Cancel: Vitamin D,25-Hydroxy; Future  -     Cancel: Comprehensive Metabolic Panel; Future  -     Cancel: Vitamin B12; Future  -     Vitamin B12; Future  -     Comprehensive Metabolic Panel; Future  -     Vitamin D,25-Hydroxy; Future  -      Iron Profile; Future  -     TSH; Future  -     Lipid Panel; Future  -     CBC & Differential; Future  -     CBC & Differential  -     Lipid Panel  -     TSH  -     Iron Profile  -     Vitamin D,25-Hydroxy  -     Comprehensive Metabolic Panel  -     Vitamin B12    2. Acquired hypothyroidism  -     Cancel: TSH; Future  -     TSH; Future  -     TSH      Continue attempts at healthy lifestyle calorie appropriate diet and regular physical activity  She has routine gynecologic care through gynecologist  Recommended immunization prevention of serious illness patient declines influenza vaccine  Follow-up as needed ongoing management of chronic problems including hypothyroidism otherwise as needed or preventatively annually

## 2022-11-12 LAB
25(OH)D3+25(OH)D2 SERPL-MCNC: 14.4 NG/ML (ref 30–100)
ALBUMIN SERPL-MCNC: 4.8 G/DL (ref 3.8–4.8)
ALBUMIN/GLOB SERPL: 2.4 {RATIO} (ref 1.2–2.2)
ALP SERPL-CCNC: 56 IU/L (ref 44–121)
ALT SERPL-CCNC: 15 IU/L (ref 0–32)
AST SERPL-CCNC: 17 IU/L (ref 0–40)
BASOPHILS # BLD AUTO: 0 X10E3/UL (ref 0–0.2)
BASOPHILS NFR BLD AUTO: 1 %
BILIRUB SERPL-MCNC: 0.3 MG/DL (ref 0–1.2)
BUN SERPL-MCNC: 12 MG/DL (ref 6–20)
BUN/CREAT SERPL: 18 (ref 9–23)
CALCIUM SERPL-MCNC: 9 MG/DL (ref 8.7–10.2)
CHLORIDE SERPL-SCNC: 102 MMOL/L (ref 96–106)
CHOLEST SERPL-MCNC: 164 MG/DL (ref 100–199)
CO2 SERPL-SCNC: 24 MMOL/L (ref 20–29)
CREAT SERPL-MCNC: 0.66 MG/DL (ref 0.57–1)
EGFRCR SERPLBLD CKD-EPI 2021: 118 ML/MIN/1.73
EOSINOPHIL # BLD AUTO: 0.1 X10E3/UL (ref 0–0.4)
EOSINOPHIL NFR BLD AUTO: 2 %
ERYTHROCYTE [DISTWIDTH] IN BLOOD BY AUTOMATED COUNT: 13.1 % (ref 11.7–15.4)
GLOBULIN SER CALC-MCNC: 2 G/DL (ref 1.5–4.5)
GLUCOSE SERPL-MCNC: 105 MG/DL (ref 70–99)
HCT VFR BLD AUTO: 41.1 % (ref 34–46.6)
HDLC SERPL-MCNC: 45 MG/DL
HGB BLD-MCNC: 13.1 G/DL (ref 11.1–15.9)
IMM GRANULOCYTES # BLD AUTO: 0 X10E3/UL (ref 0–0.1)
IMM GRANULOCYTES NFR BLD AUTO: 0 %
IRON SATN MFR SERPL: 21 % (ref 15–55)
IRON SERPL-MCNC: 71 UG/DL (ref 27–159)
LDLC SERPL CALC-MCNC: 101 MG/DL (ref 0–99)
LYMPHOCYTES # BLD AUTO: 1.6 X10E3/UL (ref 0.7–3.1)
LYMPHOCYTES NFR BLD AUTO: 30 %
MCH RBC QN AUTO: 28.3 PG (ref 26.6–33)
MCHC RBC AUTO-ENTMCNC: 31.9 G/DL (ref 31.5–35.7)
MCV RBC AUTO: 89 FL (ref 79–97)
MONOCYTES # BLD AUTO: 0.4 X10E3/UL (ref 0.1–0.9)
MONOCYTES NFR BLD AUTO: 8 %
MORPHOLOGY BLD-IMP: ABNORMAL
NEUTROPHILS # BLD AUTO: 3.1 X10E3/UL (ref 1.4–7)
NEUTROPHILS NFR BLD AUTO: 59 %
PLATELET # BLD AUTO: 110 X10E3/UL (ref 150–450)
POTASSIUM SERPL-SCNC: 4.1 MMOL/L (ref 3.5–5.2)
PROT SERPL-MCNC: 6.8 G/DL (ref 6–8.5)
RBC # BLD AUTO: 4.63 X10E6/UL (ref 3.77–5.28)
SODIUM SERPL-SCNC: 141 MMOL/L (ref 134–144)
TIBC SERPL-MCNC: 343 UG/DL (ref 250–450)
TRIGL SERPL-MCNC: 99 MG/DL (ref 0–149)
TSH SERPL DL<=0.005 MIU/L-ACNC: 1.95 UIU/ML (ref 0.45–4.5)
UIBC SERPL-MCNC: 272 UG/DL (ref 131–425)
VIT B12 SERPL-MCNC: 356 PG/ML (ref 232–1245)
VLDLC SERPL CALC-MCNC: 18 MG/DL (ref 5–40)
WBC # BLD AUTO: 5.1 X10E3/UL (ref 3.4–10.8)

## 2022-11-14 RX ORDER — MELATONIN 10 MG
1 TABLET, SUBLINGUAL SUBLINGUAL DAILY
Qty: 90 TABLET | Refills: 1 | Status: SHIPPED | OUTPATIENT
Start: 2022-11-14

## 2022-11-18 ENCOUNTER — TELEPHONE (OUTPATIENT)
Dept: INTERNAL MEDICINE | Facility: CLINIC | Age: 32
End: 2022-11-18

## 2022-11-18 NOTE — TELEPHONE ENCOUNTER
Caller: Kathleen Claire    Relationship: Self    Best call back number: [phone] (Home)    Caller requesting test results:     What test was performed: LABS    When was the test performed: 14TH    Where was the test performed: OFFICE    Additional notes:

## 2022-11-20 DIAGNOSIS — E03.9 ACQUIRED HYPOTHYROIDISM: ICD-10-CM

## 2022-11-21 RX ORDER — LEVOTHYROXINE SODIUM 0.05 MG/1
TABLET ORAL
Qty: 90 TABLET | Refills: 0 | Status: SHIPPED | OUTPATIENT
Start: 2022-11-21 | End: 2023-02-28

## 2022-11-30 NOTE — TELEPHONE ENCOUNTER
Caller: Kathleen Claire    Relationship: Self    Best call back number: [phone]    What was the call regarding: PATIENT STATED THAT SHE HAS NOT RECEIVED HER RESULTS IN THE MAIL YET. PATIENT STATED THAT SHE WOULD LIKE A CALL BACK ASAP TO GO OVER THE RESULTS. PLEASE ADVISE.    Do you require a callback: YES

## 2023-02-16 ENCOUNTER — OFFICE VISIT (OUTPATIENT)
Dept: INTERNAL MEDICINE | Facility: CLINIC | Age: 33
End: 2023-02-16
Payer: COMMERCIAL

## 2023-02-16 VITALS
BODY MASS INDEX: 29.75 KG/M2 | HEART RATE: 61 BPM | SYSTOLIC BLOOD PRESSURE: 110 MMHG | OXYGEN SATURATION: 100 % | DIASTOLIC BLOOD PRESSURE: 60 MMHG | WEIGHT: 167.9 LBS | TEMPERATURE: 97.1 F | HEIGHT: 63 IN

## 2023-02-16 DIAGNOSIS — K21.00 GASTROESOPHAGEAL REFLUX DISEASE WITH ESOPHAGITIS, UNSPECIFIED WHETHER HEMORRHAGE: Primary | ICD-10-CM

## 2023-02-16 DIAGNOSIS — J02.8 ACUTE PHARYNGITIS DUE TO OTHER SPECIFIED ORGANISMS: ICD-10-CM

## 2023-02-16 PROCEDURE — 99213 OFFICE O/P EST LOW 20 MIN: CPT | Performed by: FAMILY MEDICINE

## 2023-02-16 RX ORDER — PREDNISONE 10 MG/1
TABLET ORAL
Qty: 27 TABLET | Refills: 0 | Status: CANCELLED | OUTPATIENT
Start: 2023-02-16

## 2023-02-16 RX ORDER — PANTOPRAZOLE SODIUM 40 MG/1
40 TABLET, DELAYED RELEASE ORAL DAILY
Qty: 30 TABLET | Refills: 1 | Status: SHIPPED | OUTPATIENT
Start: 2023-02-16

## 2023-02-16 NOTE — PROGRESS NOTES
"Chief Complaint  [facility] follow up to sore throat    Subjective        Kathleen Claire presents to [facility]  History of Present Illness  Patient follows up for persistent pharyngitis treated with Augmentin at urgent care recently still has some lingering symptoms interestingly she had EGD done in 2018 revealing esophagitis due to reflux treated with PPI and symptoms improved she also had dilation of esophagus at that time  Objective   Vital Signs:  /60 (BP Location: Right arm, Patient Position: Sitting, Cuff Size: Adult)   Pulse 61   Temp 97.1 °F (36.2 °C) (Infrared)   Ht 160 cm (62.99\")   Wt 76.2 kg (167 lb 14.4 oz)   SpO2 100%   BMI 29.75 kg/m²   Estimated body mass index is 29.75 kg/m² as calculated from the following:    Height as of this encounter: 160 cm (62.99\").    Weight as of this encounter: 76.2 kg (167 lb 14.4 oz).             Physical Exam  Vitals and nursing note reviewed.   HENT:      Mouth/Throat:      Pharynx: Posterior oropharyngeal erythema present.   Lymphadenopathy:      Cervical: No cervical adenopathy.   Neurological:      Mental Status: She is alert.   Psychiatric:         Mood and Affect: Mood normal.         Behavior: Behavior normal.         Thought Content: Thought content normal.         Judgment: Judgment normal.        Result Review :                   Assessment and Plan   Diagnoses and all orders for this visit:    1. Gastroesophageal reflux disease with esophagitis, unspecified whether hemorrhage (Primary)    2. Acute pharyngitis due to other specified organisms    Other orders  -     pantoprazole (Protonix) 40 MG EC tablet; Take 1 tablet by mouth Daily.  Dispense: 30 tablet; Refill: 1     discontinue omeprazole  Initiate pantoprazole  Need follow-up EGD       Follow Up   Return in about 1 month (around 3/16/2023), or if symptoms worsen or fail to improve, for Recheck.  Patient was given instructions and counseling regarding " her condition or for health maintenance advice. Please see specific information pulled into the AVS if appropriate.

## 2023-02-28 DIAGNOSIS — E03.9 ACQUIRED HYPOTHYROIDISM: ICD-10-CM

## 2023-02-28 RX ORDER — LEVOTHYROXINE SODIUM 0.05 MG/1
TABLET ORAL
Qty: 90 TABLET | Refills: 0 | Status: SHIPPED | OUTPATIENT
Start: 2023-02-28

## 2023-05-30 RX ORDER — FERROUS SULFATE 325(65) MG
325 TABLET ORAL EVERY OTHER DAY
Qty: 90 TABLET | Refills: 3 | Status: SHIPPED | OUTPATIENT
Start: 2023-05-30

## 2023-05-31 DIAGNOSIS — E03.9 ACQUIRED HYPOTHYROIDISM: ICD-10-CM

## 2023-05-31 RX ORDER — LEVOTHYROXINE SODIUM 0.05 MG/1
TABLET ORAL
Qty: 90 TABLET | Refills: 0 | Status: SHIPPED | OUTPATIENT
Start: 2023-05-31

## 2023-06-13 ENCOUNTER — TELEPHONE (OUTPATIENT)
Dept: ONCOLOGY | Facility: CLINIC | Age: 33
End: 2023-06-13
Payer: COMMERCIAL

## 2023-06-13 NOTE — TELEPHONE ENCOUNTER
----- Message from Piper Kowalski RN sent at 6/13/2023 10:45 AM EDT -----  She called me to request a follow up appt.  She hasn't been seen in a little while.  She can see NP or MD.  She will need labs.    Thank you

## 2023-06-13 NOTE — TELEPHONE ENCOUNTER
Kathleen contacted me today to request a follow up appt to check labs and see MD.  She has not been since since April 2022.  Advised her I would send a message to scheduling and they will call her for appt.  She voiced understanding.

## 2023-06-27 ENCOUNTER — TELEPHONE (OUTPATIENT)
Dept: INTERNAL MEDICINE | Facility: CLINIC | Age: 33
End: 2023-06-27

## 2023-06-27 NOTE — TELEPHONE ENCOUNTER
Caller: Kathleen Claire    Relationship: Self    Best call back number: [phone]     What is the medical concern/diagnosis: LEFT ANKLE PAIN    What specialty or service is being requested: ORTHOPEDIC    Any additional details: PATIENT IS CALLING TO REQUEST A REFERRAL FOR ORTHOPEDIC FOR LEFT ANKLE INJURY.      PLEASE ADVISE.

## 2023-08-16 DIAGNOSIS — E03.9 ACQUIRED HYPOTHYROIDISM: ICD-10-CM

## 2023-08-17 RX ORDER — LEVOTHYROXINE SODIUM 0.05 MG/1
TABLET ORAL
Qty: 90 TABLET | Refills: 0 | Status: SHIPPED | OUTPATIENT
Start: 2023-08-17

## 2023-11-12 DIAGNOSIS — E03.9 ACQUIRED HYPOTHYROIDISM: ICD-10-CM

## 2023-11-13 RX ORDER — LEVOTHYROXINE SODIUM 0.05 MG/1
TABLET ORAL
Qty: 90 TABLET | Refills: 0 | Status: SHIPPED | OUTPATIENT
Start: 2023-11-13

## 2023-12-01 ENCOUNTER — TELEPHONE (OUTPATIENT)
Dept: INTERNAL MEDICINE | Facility: CLINIC | Age: 33
End: 2023-12-01

## 2023-12-01 NOTE — TELEPHONE ENCOUNTER
Caller: Kathleen Claire    Relationship: Self    Best call back number: [phone]    What is the best time to reach you: ANYTIME     Who are you requesting to speak with (clinical staff, provider,  specific staff member): CLINICAL STAFF     Do you know the name of the person who called: SELF    What was the call regarding: PATIENT CALLED AND STATED TO PLEASE HAVE A COPY OF HER LAB WORK AND LAST ANNUAL EXAM AT THE  FOR     Is it okay if the provider responds through MyChart: YES

## 2023-12-01 NOTE — TELEPHONE ENCOUNTER
Caller: ELMER ATKINSON    Relationship: Emergency Contact    Best call back number: [phone]     What form or medical record are you requesting: DOCUMENT STATING WHEN THE LAST TIME PATIENT HAD LABS COMPLETED IN THE OFFICE    Who is requesting this form or medical record from you: EMPLOYER    How would you like to receive the form or medical records (pick-up, mail, fax):      Timeframe paperwork needed: MONDAY 12-4-23

## 2023-12-11 ENCOUNTER — LAB (OUTPATIENT)
Dept: LAB | Facility: HOSPITAL | Age: 33
End: 2023-12-11
Payer: COMMERCIAL

## 2023-12-11 ENCOUNTER — HOSPITAL ENCOUNTER (OUTPATIENT)
Dept: GENERAL RADIOLOGY | Facility: HOSPITAL | Age: 33
Discharge: HOME | End: 2023-12-11
Payer: COMMERCIAL

## 2023-12-11 ENCOUNTER — OFFICE VISIT (OUTPATIENT)
Dept: INTERNAL MEDICINE | Facility: CLINIC | Age: 33
End: 2023-12-11
Payer: COMMERCIAL

## 2023-12-11 VITALS
SYSTOLIC BLOOD PRESSURE: 118 MMHG | HEART RATE: 72 BPM | OXYGEN SATURATION: 100 % | WEIGHT: 172 LBS | DIASTOLIC BLOOD PRESSURE: 66 MMHG | RESPIRATION RATE: 14 BRPM | BODY MASS INDEX: 30.48 KG/M2

## 2023-12-11 DIAGNOSIS — E03.9 ACQUIRED HYPOTHYROIDISM: ICD-10-CM

## 2023-12-11 DIAGNOSIS — G89.29 CHRONIC PAIN OF RIGHT KNEE: ICD-10-CM

## 2023-12-11 DIAGNOSIS — D69.6 THROMBOCYTOPENIA: ICD-10-CM

## 2023-12-11 DIAGNOSIS — E53.8 B12 DEFICIENCY: ICD-10-CM

## 2023-12-11 DIAGNOSIS — E55.9 VITAMIN D DEFICIENCY: ICD-10-CM

## 2023-12-11 DIAGNOSIS — M25.561 CHRONIC PAIN OF RIGHT KNEE: ICD-10-CM

## 2023-12-11 DIAGNOSIS — Z00.00 HEALTHCARE MAINTENANCE: Primary | ICD-10-CM

## 2023-12-11 PROBLEM — R07.9 CHEST PAIN: Status: RESOLVED | Noted: 2022-03-01 | Resolved: 2023-12-11

## 2023-12-11 PROBLEM — K52.9 GASTROENTERITIS: Status: RESOLVED | Noted: 2022-03-01 | Resolved: 2023-12-11

## 2023-12-11 LAB
25(OH)D3 SERPL-MCNC: 25.2 NG/ML (ref 30–100)
ALBUMIN SERPL-MCNC: 4.7 G/DL (ref 3.5–5.2)
ALBUMIN/GLOB SERPL: 1.7 G/DL
ALP SERPL-CCNC: 71 U/L (ref 39–117)
ALT SERPL W P-5'-P-CCNC: 18 U/L (ref 1–33)
ANION GAP SERPL CALCULATED.3IONS-SCNC: 12.2 MMOL/L (ref 5–15)
AST SERPL-CCNC: 19 U/L (ref 1–32)
BASOPHILS # BLD AUTO: 0.03 10*3/MM3 (ref 0–0.2)
BASOPHILS NFR BLD AUTO: 0.6 % (ref 0–1.5)
BILIRUB SERPL-MCNC: 0.4 MG/DL (ref 0–1.2)
BUN SERPL-MCNC: 10 MG/DL (ref 6–20)
BUN/CREAT SERPL: 16.9 (ref 7–25)
CALCIUM SPEC-SCNC: 9.4 MG/DL (ref 8.6–10.5)
CHLORIDE SERPL-SCNC: 101 MMOL/L (ref 98–107)
CHOLEST SERPL-MCNC: 173 MG/DL (ref 0–200)
CO2 SERPL-SCNC: 24.8 MMOL/L (ref 22–29)
CREAT SERPL-MCNC: 0.59 MG/DL (ref 0.57–1)
DEPRECATED RDW RBC AUTO: 38.5 FL (ref 37–54)
EGFRCR SERPLBLD CKD-EPI 2021: 120.7 ML/MIN/1.73
EOSINOPHIL # BLD AUTO: 0.12 10*3/MM3 (ref 0–0.4)
EOSINOPHIL NFR BLD AUTO: 2.3 % (ref 0.3–6.2)
ERYTHROCYTE [DISTWIDTH] IN BLOOD BY AUTOMATED COUNT: 12.9 % (ref 12.3–15.4)
FERRITIN SERPL-MCNC: 56.9 NG/ML (ref 13–150)
GLOBULIN UR ELPH-MCNC: 2.8 GM/DL
GLUCOSE SERPL-MCNC: 98 MG/DL (ref 65–99)
HCT VFR BLD AUTO: 38.6 % (ref 34–46.6)
HDLC SERPL-MCNC: 43 MG/DL (ref 40–60)
HGB BLD-MCNC: 12.5 G/DL (ref 12–15.9)
IMM GRANULOCYTES # BLD AUTO: 0.01 10*3/MM3 (ref 0–0.05)
IMM GRANULOCYTES NFR BLD AUTO: 0.2 % (ref 0–0.5)
IRON 24H UR-MRATE: 78 MCG/DL (ref 37–145)
IRON SATN MFR SERPL: 18 % (ref 20–50)
LDLC SERPL CALC-MCNC: 110 MG/DL (ref 0–100)
LDLC/HDLC SERPL: 2.53 {RATIO}
LYMPHOCYTES # BLD AUTO: 1.36 10*3/MM3 (ref 0.7–3.1)
LYMPHOCYTES NFR BLD AUTO: 25.7 % (ref 19.6–45.3)
MCH RBC QN AUTO: 27.2 PG (ref 26.6–33)
MCHC RBC AUTO-ENTMCNC: 32.4 G/DL (ref 31.5–35.7)
MCV RBC AUTO: 84.1 FL (ref 79–97)
MONOCYTES # BLD AUTO: 0.45 10*3/MM3 (ref 0.1–0.9)
MONOCYTES NFR BLD AUTO: 8.5 % (ref 5–12)
NEUTROPHILS NFR BLD AUTO: 3.32 10*3/MM3 (ref 1.7–7)
NEUTROPHILS NFR BLD AUTO: 62.7 % (ref 42.7–76)
NRBC BLD AUTO-RTO: 0 /100 WBC (ref 0–0.2)
PLATELET # BLD AUTO: 96 10*3/MM3 (ref 140–450)
POTASSIUM SERPL-SCNC: 3.9 MMOL/L (ref 3.5–5.2)
PROT SERPL-MCNC: 7.5 G/DL (ref 6–8.5)
RBC # BLD AUTO: 4.59 10*6/MM3 (ref 3.77–5.28)
SODIUM SERPL-SCNC: 138 MMOL/L (ref 136–145)
TIBC SERPL-MCNC: 428 MCG/DL (ref 298–536)
TRANSFERRIN SERPL-MCNC: 287 MG/DL (ref 200–360)
TRIGL SERPL-MCNC: 107 MG/DL (ref 0–150)
TSH SERPL DL<=0.05 MIU/L-ACNC: 2.67 UIU/ML (ref 0.27–4.2)
VIT B12 BLD-MCNC: 477 PG/ML (ref 211–946)
VLDLC SERPL-MCNC: 20 MG/DL (ref 5–40)
WBC NRBC COR # BLD AUTO: 5.29 10*3/MM3 (ref 3.4–10.8)

## 2023-12-11 PROCEDURE — 82607 VITAMIN B-12: CPT

## 2023-12-11 PROCEDURE — 82728 ASSAY OF FERRITIN: CPT

## 2023-12-11 PROCEDURE — 84466 ASSAY OF TRANSFERRIN: CPT

## 2023-12-11 PROCEDURE — 82306 VITAMIN D 25 HYDROXY: CPT | Performed by: FAMILY MEDICINE

## 2023-12-11 PROCEDURE — 73560 X-RAY EXAM OF KNEE 1 OR 2: CPT

## 2023-12-11 PROCEDURE — 80061 LIPID PANEL: CPT | Performed by: FAMILY MEDICINE

## 2023-12-11 PROCEDURE — 80050 GENERAL HEALTH PANEL: CPT

## 2023-12-11 PROCEDURE — 83540 ASSAY OF IRON: CPT

## 2023-12-11 PROCEDURE — 99395 PREV VISIT EST AGE 18-39: CPT | Performed by: FAMILY MEDICINE

## 2023-12-11 PROCEDURE — 36415 COLL VENOUS BLD VENIPUNCTURE: CPT

## 2023-12-11 NOTE — PROGRESS NOTES
Subjective   Kathleen Claire is a 33 y.o. female.     Chief Complaint   Patient presents with    Annual Exam         History of Present Illness   Kathleen Claire 33 y.o. female who presents for an Annual Wellness Visit.  she has a history of   Patient Active Problem List   Diagnosis    Acquired hypothyroidism    Healthcare maintenance    Overweight (BMI 25.0-29.9)    Thrombocytopenia    Gastroesophageal reflux disease with esophagitis without hemorrhage    Neck pain on left side    Anemia due to vitamin B12 deficiency    Gastroesophageal reflux disease with esophagitis    Acute pharyngitis due to other specified organisms    Vitamin D deficiency    Chronic pain of right knee   .  she has been feeling well.   I  reviewed health maintenance with her as part of my preventative care plan.  Recommend regular dental and eye exams  The following portions of the patient's history were reviewed and updated as appropriate: allergies, current medications, past family history, past medical history, past social history, past surgical history, and problem list.  Patient has some right knee pain longstanding no specific trauma points to the lateral aspect  Review of Systems    Objective   Physical Exam  Vitals and nursing note reviewed.   Constitutional:       Appearance: Normal appearance. She is well-developed. She is not diaphoretic.   HENT:      Head: Normocephalic and atraumatic.      Right Ear: Tympanic membrane, ear canal and external ear normal.      Left Ear: Tympanic membrane, ear canal and external ear normal.      Nose: No congestion.   Eyes:      General: Lids are normal. No scleral icterus.     Extraocular Movements: Extraocular movements intact.      Conjunctiva/sclera: Conjunctivae normal.   Neck:      Thyroid: No thyroid mass or thyromegaly.      Vascular: No carotid bruit or JVD.   Cardiovascular:      Rate and Rhythm: Normal rate and regular rhythm.      Pulses: Normal pulses.           Radial pulses are 2+ on the  right side and 2+ on the left side.      Heart sounds: Normal heart sounds. No murmur heard.  Pulmonary:      Effort: Pulmonary effort is normal. No respiratory distress.      Breath sounds: Normal breath sounds.   Abdominal:      Palpations: Abdomen is soft.      Tenderness: There is no right CVA tenderness or left CVA tenderness.   Musculoskeletal:      Cervical back: Normal range of motion.      Right knee: No swelling or deformity. No LCL laxity, MCL laxity, ACL laxity or PCL laxity. Normal alignment.      Right lower leg: No edema.      Left lower leg: No edema.   Skin:     General: Skin is warm and dry.      Coloration: Skin is not pale.      Findings: No erythema or rash.   Neurological:      General: No focal deficit present.      Mental Status: She is alert and oriented to person, place, and time.      Sensory: No sensory deficit.      Deep Tendon Reflexes: Reflexes are normal and symmetric.   Psychiatric:         Mood and Affect: Mood normal.         Behavior: Behavior normal. Behavior is cooperative.         Thought Content: Thought content normal.         Judgment: Judgment normal.         Assessment & Plan   Diagnoses and all orders for this visit:    1. Healthcare maintenance (Primary)  -     TSH  -     Lipid Panel  -     Comprehensive Metabolic Panel    2. Vitamin D deficiency  -     Vitamin D,25-Hydroxy    3. Chronic pain of right knee  -     XR Knee 1 or 2 View Right    4. Thrombocytopenia    5. Acquired hypothyroidism      Continue [place] healthy lifestyle calorie appropriate diet and regular physical activity  Education provided regarding prevention of serious illness with immunizations patient is current  Follow-up ongoing management of chronic problems otherwise preventatively annually            Other

## 2023-12-21 ENCOUNTER — OFFICE VISIT (OUTPATIENT)
Dept: ONCOLOGY | Facility: CLINIC | Age: 33
End: 2023-12-21
Payer: COMMERCIAL

## 2023-12-21 ENCOUNTER — LAB (OUTPATIENT)
Dept: OTHER | Facility: HOSPITAL | Age: 33
End: 2023-12-21
Payer: COMMERCIAL

## 2023-12-21 VITALS
RESPIRATION RATE: 16 BRPM | OXYGEN SATURATION: 97 % | HEIGHT: 63 IN | HEART RATE: 76 BPM | BODY MASS INDEX: 31.02 KG/M2 | WEIGHT: 175.1 LBS | TEMPERATURE: 98 F | SYSTOLIC BLOOD PRESSURE: 97 MMHG | DIASTOLIC BLOOD PRESSURE: 60 MMHG

## 2023-12-21 DIAGNOSIS — E53.8 B12 DEFICIENCY: ICD-10-CM

## 2023-12-21 DIAGNOSIS — D69.6 THROMBOCYTOPENIA: Primary | ICD-10-CM

## 2023-12-21 DIAGNOSIS — D69.6 THROMBOCYTOPENIA: ICD-10-CM

## 2023-12-21 LAB
ANISOCYTOSIS BLD QL: NORMAL
BASOPHILS # BLD AUTO: 0.04 10*3/MM3 (ref 0–0.2)
BASOPHILS NFR BLD AUTO: 0.6 % (ref 0–1.5)
DEPRECATED RDW RBC AUTO: 41.4 FL (ref 37–54)
EOSINOPHIL # BLD AUTO: 0.14 10*3/MM3 (ref 0–0.4)
EOSINOPHIL NFR BLD AUTO: 2.1 % (ref 0.3–6.2)
ERYTHROCYTE [DISTWIDTH] IN BLOOD BY AUTOMATED COUNT: 12.9 % (ref 12.3–15.4)
FERRITIN SERPL-MCNC: 56.1 NG/ML (ref 13–150)
HCT VFR BLD AUTO: 39.8 % (ref 34–46.6)
HGB BLD-MCNC: 12.7 G/DL (ref 12–15.9)
IMM GRANULOCYTES # BLD AUTO: 0.01 10*3/MM3 (ref 0–0.05)
IMM GRANULOCYTES NFR BLD AUTO: 0.2 % (ref 0–0.5)
IRON 24H UR-MRATE: 79 MCG/DL (ref 37–145)
IRON SATN MFR SERPL: 19 % (ref 20–50)
LYMPHOCYTES # BLD AUTO: 2.15 10*3/MM3 (ref 0.7–3.1)
LYMPHOCYTES NFR BLD AUTO: 32.5 % (ref 19.6–45.3)
MCH RBC QN AUTO: 28.1 PG (ref 26.6–33)
MCHC RBC AUTO-ENTMCNC: 31.9 G/DL (ref 31.5–35.7)
MCV RBC AUTO: 88.1 FL (ref 79–97)
MICROCYTES BLD QL: NORMAL
MONOCYTES # BLD AUTO: 0.32 10*3/MM3 (ref 0.1–0.9)
MONOCYTES NFR BLD AUTO: 4.8 % (ref 5–12)
NEUTROPHILS NFR BLD AUTO: 3.96 10*3/MM3 (ref 1.7–7)
NEUTROPHILS NFR BLD AUTO: 59.8 % (ref 42.7–76)
NRBC BLD AUTO-RTO: 0 /100 WBC (ref 0–0.2)
OVALOCYTES BLD QL SMEAR: NORMAL
PLAT MORPH BLD: NORMAL
PLATELET # BLD AUTO: 117 10*3/MM3 (ref 140–450)
RBC # BLD AUTO: 4.52 10*6/MM3 (ref 3.77–5.28)
STOMATOCYTES BLD QL SMEAR: NORMAL
TIBC SERPL-MCNC: 414 MCG/DL (ref 298–536)
TRANSFERRIN SERPL-MCNC: 278 MG/DL (ref 200–360)
VIT B12 BLD-MCNC: 428 PG/ML (ref 211–946)
WBC MORPH BLD: NORMAL
WBC NRBC COR # BLD AUTO: 6.62 10*3/MM3 (ref 3.4–10.8)

## 2023-12-21 PROCEDURE — 85025 COMPLETE CBC W/AUTO DIFF WBC: CPT | Performed by: INTERNAL MEDICINE

## 2023-12-21 PROCEDURE — 82728 ASSAY OF FERRITIN: CPT | Performed by: INTERNAL MEDICINE

## 2023-12-21 PROCEDURE — 36415 COLL VENOUS BLD VENIPUNCTURE: CPT

## 2023-12-21 PROCEDURE — 99214 OFFICE O/P EST MOD 30 MIN: CPT | Performed by: INTERNAL MEDICINE

## 2023-12-21 PROCEDURE — 83540 ASSAY OF IRON: CPT | Performed by: INTERNAL MEDICINE

## 2023-12-21 PROCEDURE — 84466 ASSAY OF TRANSFERRIN: CPT | Performed by: INTERNAL MEDICINE

## 2023-12-21 PROCEDURE — 85007 BL SMEAR W/DIFF WBC COUNT: CPT | Performed by: INTERNAL MEDICINE

## 2023-12-21 PROCEDURE — 82607 VITAMIN B-12: CPT | Performed by: INTERNAL MEDICINE

## 2023-12-21 NOTE — PROGRESS NOTES
Subjective   Kathleen Claire is a 33 y.o. female.  Referred by  for evaluation of thrombocytopenia.      History of Present Illness    is a 30-year-old premenopausal  lady with longstanding history of thrombocytopenia.  She was diagnosed with thrombocytopenia prior to having her first child about 8 years ago.  She reports being placed on steroids recently around the time of delivery of her second child about 9 months ago.  At that point she was evaluated by Dr. Nehemias Carmen at [facility] who recommended steroids to help increase the platelet count.  100,000 to facilitate epidural.  She moved to [place] about a year ago from [place] where she reportedly had seen a hematologist and was diagnosed with ITP.  She denies being on any medications chronically except for levothyroxine, denies any new herbal medications or herbal teas.  On review of the labs she does have B12 deficiency with a B12 level at 100.  She has been on oral B12 replacement however she is has not taken that since she got pregnant with her second child.  She has never been on parenteral B12 replacement.  She denies any excessive bleeding or bruising.  Denies any risk factors for HIV or hepatitis B.  Denies any history of alcohol use.  She reports a well-rounded diet.   Review of labs over the past year showed platelet counts ranging from ,000.Iron studies performed November 2019 suggestive of iron deficiency with ferritin of 7 and iron saturation 19.  She reports excessive fatigue.  Denies any recent weight changes, no fevers, no chills, no new lymphadenopathy.    Interval history  Kathleen presents today for follow-up.  She reports feeling tired all the time although she has started a new job as a  and this might be contributing to the fatigue.  She has been compliant with oral iron which she takes every other day and vitamin B12.    The following portions of the patient's history were reviewed and  "updated as appropriate: allergies, current medications, past family history, past medical history, past social history, past surgical history and problem list.    No past medical history on file.     Past Surgical History:   Procedure Laterality Date     SECTION          No family history on file.     Social History     Socioeconomic History    Marital status:    Tobacco Use    Smoking status: Never    Smokeless tobacco: Never   Substance and Sexual Activity    Alcohol use: Never    Drug use: Never    Sexual activity: Yes     Partners: Male     Birth control/protection: Condom        OB History    No obstetric history on file.          No Known Allergies       Review of systems as mentioned in the HPI otherwise negative    Objective   Blood pressure 97/60, pulse 76, temperature 98 °F (36.7 °C), temperature source Temporal, resp. rate 16, height 160 cm (62.99\"), weight 79.4 kg (175 lb 1.6 oz), last menstrual period 2023, SpO2 97%, not currently breastfeeding.     Physical Exam  Vitals reviewed.   Constitutional:       Appearance: Normal appearance. She is normal weight.   HENT:      Head: Normocephalic.      Nose: Nose normal.      Mouth/Throat:      Mouth: Mucous membranes are moist.      Pharynx: Oropharynx is clear.   Eyes:      Conjunctiva/sclera: Conjunctivae normal.      Pupils: Pupils are equal, round, and reactive to light.   Cardiovascular:      Rate and Rhythm: Normal rate and regular rhythm.      Heart sounds: Normal heart sounds. No murmur heard.     No gallop.   Pulmonary:      Effort: Pulmonary effort is normal.   Abdominal:      General: Abdomen is flat. Bowel sounds are normal.      Palpations: Abdomen is soft.   Musculoskeletal:         General: Normal range of motion.      Cervical back: Normal range of motion. No muscular tenderness.   Skin:     General: Skin is warm and dry.      Findings: No rash.   Neurological:      General: No focal deficit present.      Mental Status: She " is alert and oriented to person, place, and time. Mental status is at baseline.      Motor: No weakness.   Psychiatric:         Mood and Affect: Mood normal.         Behavior: Behavior normal.         Thought Content: Thought content normal.         Judgment: Judgment normal.       I have reexamined the patient and the results are consistent with the previously documented exam. Beatriz Valencia MD      Lab on 12/11/2023   Component Date Value Ref Range Status    WBC 12/11/2023 5.29  3.40 - 10.80 10*3/mm3 Final    RBC 12/11/2023 4.59  3.77 - 5.28 10*6/mm3 Final    Hemoglobin 12/11/2023 12.5  12.0 - 15.9 g/dL Final    Hematocrit 12/11/2023 38.6  34.0 - 46.6 % Final    MCV 12/11/2023 84.1  79.0 - 97.0 fL Final    MCH 12/11/2023 27.2  26.6 - 33.0 pg Final    MCHC 12/11/2023 32.4  31.5 - 35.7 g/dL Final    RDW 12/11/2023 12.9  12.3 - 15.4 % Final    RDW-SD 12/11/2023 38.5  37.0 - 54.0 fl Final    Platelets 12/11/2023 96 (L)  140 - 450 10*3/mm3 Final    Neutrophil % 12/11/2023 62.7  42.7 - 76.0 % Final    Lymphocyte % 12/11/2023 25.7  19.6 - 45.3 % Final    Monocyte % 12/11/2023 8.5  5.0 - 12.0 % Final    Eosinophil % 12/11/2023 2.3  0.3 - 6.2 % Final    Basophil % 12/11/2023 0.6  0.0 - 1.5 % Final    Immature Grans % 12/11/2023 0.2  0.0 - 0.5 % Final    Neutrophils, Absolute 12/11/2023 3.32  1.70 - 7.00 10*3/mm3 Final    Lymphocytes, Absolute 12/11/2023 1.36  0.70 - 3.10 10*3/mm3 Final    Monocytes, Absolute 12/11/2023 0.45  0.10 - 0.90 10*3/mm3 Final    Eosinophils, Absolute 12/11/2023 0.12  0.00 - 0.40 10*3/mm3 Final    Basophils, Absolute 12/11/2023 0.03  0.00 - 0.20 10*3/mm3 Final    Immature Grans, Absolute 12/11/2023 0.01  0.00 - 0.05 10*3/mm3 Final    nRBC 12/11/2023 0.0  0.0 - 0.2 /100 WBC Final   Lab on 12/11/2023   Component Date Value Ref Range Status    Vitamin B-12 12/11/2023 477  211 - 946 pg/mL Final    Ferritin 12/11/2023 56.90  13.00 - 150.00 ng/mL Final    Iron 12/11/2023 78  37 - 145 mcg/dL Final     Iron Saturation (TSAT) 12/11/2023 18 (L)  20 - 50 % Final    Transferrin 12/11/2023 287  200 - 360 mg/dL Final    TIBC 12/11/2023 428  298 - 536 mcg/dL Final   Office Visit on 12/11/2023   Component Date Value Ref Range Status    TSH 12/11/2023 2.670  0.270 - 4.200 uIU/mL Final    Total Cholesterol 12/11/2023 173  0 - 200 mg/dL Final    Triglycerides 12/11/2023 107  0 - 150 mg/dL Final    HDL Cholesterol 12/11/2023 43  40 - 60 mg/dL Final    LDL Cholesterol  12/11/2023 110 (H)  0 - 100 mg/dL Final    VLDL Cholesterol 12/11/2023 20  5 - 40 mg/dL Final    LDL/HDL Ratio 12/11/2023 2.53   Final    Glucose 12/11/2023 98  65 - 99 mg/dL Final    BUN 12/11/2023 10  6 - 20 mg/dL Final    Creatinine 12/11/2023 0.59  0.57 - 1.00 mg/dL Final    Sodium 12/11/2023 138  136 - 145 mmol/L Final    Potassium 12/11/2023 3.9  3.5 - 5.2 mmol/L Final    Slight hemolysis detected by analyzer. Result may be falsely elevated.    Chloride 12/11/2023 101  98 - 107 mmol/L Final    CO2 12/11/2023 24.8  22.0 - 29.0 mmol/L Final    Calcium 12/11/2023 9.4  8.6 - 10.5 mg/dL Final    Total Protein 12/11/2023 7.5  6.0 - 8.5 g/dL Final    Albumin 12/11/2023 4.7  3.5 - 5.2 g/dL Final    ALT (SGPT) 12/11/2023 18  1 - 33 U/L Final    AST (SGOT) 12/11/2023 19  1 - 32 U/L Final    Alkaline Phosphatase 12/11/2023 71  39 - 117 U/L Final    Total Bilirubin 12/11/2023 0.4  0.0 - 1.2 mg/dL Final    Globulin 12/11/2023 2.8  gm/dL Final    A/G Ratio 12/11/2023 1.7  g/dL Final    BUN/Creatinine Ratio 12/11/2023 16.9  7.0 - 25.0 Final    Anion Gap 12/11/2023 12.2  5.0 - 15.0 mmol/L Final    eGFR 12/11/2023 120.7  >60.0 mL/min/1.73 Final    25 Hydroxy, Vitamin D 12/11/2023 25.2 (L)  30.0 - 100.0 ng/ml Final        No radiology results for the last 30 days.     6/22/2023  CBC-WBC 4.99, ANC 3.05, hemoglobin 12.2, platelets 111,000.      Assessment & Plan   Diagnoses and all orders for this visit:    1. Thrombocytopenia (Primary)  -     CBC & Differential; Future  -      Vitamin B12; Future  -     Ferritin; Future  -     Iron Profile; Future  -     CBC & Differential; Future  -     Vitamin B12; Future  -     Ferritin; Future  -     Iron Profile; Future    2. B12 deficiency  -     CBC & Differential; Future  -     Vitamin B12; Future  -     Ferritin; Future  -     Iron Profile; Future  -     CBC & Differential; Future  -     Vitamin B12; Future  -     Ferritin; Future  -     Iron Profile; Future       is a 32 yr old premenopausal lady with long standing thrombocytopenia here for evaluation of the thrombocytoepnia    1.Thrombocytopenia  Secondary to ITP given elevated IPF  Platelet count today 111,000.  B12 levels normal on 12/11/2023, continue B12 supplementation  Platelets lower at 96,000 but overall stable    2.  B12 deficiency  She had been compliant with B12 previously however in the past few weeks she has been noncompliant with her oral B12.  She reports increased fatigue  B12 is normal on 2/11/2023  Continue B12 supplementation    3.iron deficiency  Hemoglobin normal at 12.4  Iron saturation normal at 27%  TIBC normal  Ferritin normal at 92.5  12/12/2023-ferritin 56, iron saturation 18%, TIBC slightly elevated at 443.  Continue oral iron every other day  Hemoglobin normal    4.fatigue  Did improve with B12 and iron supplementation however has been worse over the past 3 to 4 weeks due to viral illness  6/22/2023: Previously lost to follow-up, however rescheduled due to worsening fatigue over the last few weeks.  Hemoglobin normal at 12.2.  Iron labs pending.  Vitamin B12 level pending.  12/12/2023 TSH normal    5.hypothyroidism  Continue levothyroxine-following PCP.    PLAN:   Continue oral vitamin B12.  Continue oral iron supplementation.  Return in 6 months for CBC and in 12 months for CBC with iron studies and B12    Beatriz Valencia MD  12/21/23

## 2024-01-25 ENCOUNTER — TELEPHONE (OUTPATIENT)
Dept: INTERNAL MEDICINE | Facility: CLINIC | Age: 34
End: 2024-01-25

## 2024-01-25 NOTE — TELEPHONE ENCOUNTER
PATIENT CALLED FOR SAME DAY APPOINTMENT. SHE IS HAVING SORE THROAT, AND COUGHING.    NO APPOINTMENTS AVAILABLE     PLEASE CALL [phone]    ATTEMPTED WARM TRANSFER

## 2024-02-25 DIAGNOSIS — E03.9 ACQUIRED HYPOTHYROIDISM: ICD-10-CM

## 2024-02-26 RX ORDER — LEVOTHYROXINE SODIUM 0.05 MG/1
TABLET ORAL
Qty: 90 TABLET | Refills: 1 | Status: SHIPPED | OUTPATIENT
Start: 2024-02-26

## 2024-02-26 NOTE — TELEPHONE ENCOUNTER
Rx Refill Note  Requested Prescriptions     Pending Prescriptions Disp Refills    levothyroxine (SYNTHROID, LEVOTHROID) 50 MCG tablet [Pharmacy Med Name: LEVOTHYROXINE 50 MCG TABLET] 90 tablet 1     Sig: TAKE 1 TABLET BY MOUTH EVERY DAY      Last office visit with prescribing clinician: 12/11/2023   Last telemedicine visit with prescribing clinician: Visit date not found   Next office visit with prescribing clinician: Visit date not found                         Would you like a call back once the refill request has been completed: [] Yes [] No    If the office needs to give you a call back, can they leave a voicemail: [] Yes [] No    Mariel Chapman MA  02/26/24, 11:22 EST

## 2024-06-21 ENCOUNTER — CLINICAL SUPPORT (OUTPATIENT)
Dept: ONCOLOGY | Facility: HOSPITAL | Age: 34
End: 2024-06-21
Payer: COMMERCIAL

## 2024-06-21 ENCOUNTER — LAB (OUTPATIENT)
Dept: OTHER | Facility: HOSPITAL | Age: 34
End: 2024-06-21
Payer: COMMERCIAL

## 2024-06-21 DIAGNOSIS — D69.6 THROMBOCYTOPENIA: ICD-10-CM

## 2024-06-21 DIAGNOSIS — E53.8 B12 DEFICIENCY: ICD-10-CM

## 2024-06-21 LAB
BASOPHILS # BLD AUTO: 0.03 10*3/MM3 (ref 0–0.2)
BASOPHILS NFR BLD AUTO: 0.5 % (ref 0–1.5)
DEPRECATED RDW RBC AUTO: 40.3 FL (ref 37–54)
EOSINOPHIL # BLD AUTO: 0.04 10*3/MM3 (ref 0–0.4)
EOSINOPHIL NFR BLD AUTO: 0.7 % (ref 0.3–6.2)
ERYTHROCYTE [DISTWIDTH] IN BLOOD BY AUTOMATED COUNT: 12.9 % (ref 12.3–15.4)
HCT VFR BLD AUTO: 38.4 % (ref 34–46.6)
HGB BLD-MCNC: 12.2 G/DL (ref 12–15.9)
IMM GRANULOCYTES # BLD AUTO: 0.01 10*3/MM3 (ref 0–0.05)
IMM GRANULOCYTES NFR BLD AUTO: 0.2 % (ref 0–0.5)
LYMPHOCYTES # BLD AUTO: 1.77 10*3/MM3 (ref 0.7–3.1)
LYMPHOCYTES NFR BLD AUTO: 30.1 % (ref 19.6–45.3)
MCH RBC QN AUTO: 27.5 PG (ref 26.6–33)
MCHC RBC AUTO-ENTMCNC: 31.8 G/DL (ref 31.5–35.7)
MCV RBC AUTO: 86.5 FL (ref 79–97)
MONOCYTES # BLD AUTO: 0.29 10*3/MM3 (ref 0.1–0.9)
MONOCYTES NFR BLD AUTO: 4.9 % (ref 5–12)
NEUTROPHILS NFR BLD AUTO: 3.75 10*3/MM3 (ref 1.7–7)
NEUTROPHILS NFR BLD AUTO: 63.6 % (ref 42.7–76)
NRBC BLD AUTO-RTO: 0 /100 WBC (ref 0–0.2)
PLAT MORPH BLD: NORMAL
PLATELET # BLD AUTO: 124 10*3/MM3 (ref 140–450)
PMV BLD AUTO: 13.9 FL (ref 6–12)
RBC # BLD AUTO: 4.44 10*6/MM3 (ref 3.77–5.28)
RBC MORPH BLD: NORMAL
WBC MORPH BLD: NORMAL
WBC NRBC COR # BLD AUTO: 5.89 10*3/MM3 (ref 3.4–10.8)

## 2024-06-21 PROCEDURE — 85007 BL SMEAR W/DIFF WBC COUNT: CPT | Performed by: INTERNAL MEDICINE

## 2024-06-21 PROCEDURE — 36415 COLL VENOUS BLD VENIPUNCTURE: CPT

## 2024-06-21 PROCEDURE — 85025 COMPLETE CBC W/AUTO DIFF WBC: CPT | Performed by: INTERNAL MEDICINE

## 2024-06-21 RX ORDER — LANOLIN ALCOHOL/MO/W.PET/CERES
1000 CREAM (GRAM) TOPICAL DAILY
Qty: 90 TABLET | Refills: 3 | Status: SHIPPED | OUTPATIENT
Start: 2024-06-21

## 2024-06-21 RX ORDER — FERROUS SULFATE 325(65) MG
325 TABLET ORAL EVERY OTHER DAY
Qty: 90 TABLET | Refills: 3 | Status: SHIPPED | OUTPATIENT
Start: 2024-06-21

## 2024-06-21 NOTE — PROGRESS NOTES
Kathleen Claire is a 34 y.o. female with Thrombocytopenia, B12 deficiency seen by Hematology/Oncology provider, Dr. Valencia, and is scheduled with Clinical Review offered by [facility]. Patient's visit was held via telephone today.     Therapy plan  Oral iron   Vitamin B12    Current Medication List  Medication Sig Start Date End Date Taking? Authorizing Provider   Cholecalciferol (Vitamin D3) 250 MCG (34978 UT) tablet Take 1 tablet by mouth Daily. 11/14/22   Lorenzo Bauman MD   ferrous sulfate 325 (65 FE) MG tablet TAKE 1 TABLET BY MOUTH EVERY OTHER DAY 5/30/23   Beatriz Valencia MD   ketoconazole (NIZORAL) 2 % shampoo 1 application Daily. 11/8/22   Provider, MD Vero   levothyroxine (SYNTHROID, LEVOTHROID) 50 MCG tablet TAKE 1 TABLET BY MOUTH EVERY DAY 2/26/24   Lorenzo Bauman MD   pantoprazole (Protonix) 40 MG EC tablet Take 1 tablet by mouth Daily. 2/16/23   Lorenzo Bauman MD   vitamin B-12 (CYANOCOBALAMIN) 1000 MCG tablet Take 1 tablet by mouth Daily. 4/14/22   Beatriz Valencia MD       Relevant Laboratory Values  Lab Results   Component Value Date    WBC 5.89 06/21/2024    RBC 4.44 06/21/2024    HGB 12.2 06/21/2024    HCT 38.4 06/21/2024    MCV 86.5 06/21/2024    MCH 27.5 06/21/2024    MCHC 31.8 06/21/2024    RDW 12.9 06/21/2024    RDWSD 40.3 06/21/2024    MPV 13.9 (H) 06/21/2024     (L) 06/21/2024    NEUTRORELPCT 63.6 06/21/2024    LYMPHORELPCT 30.1 06/21/2024    MONORELPCT 4.9 (L) 06/21/2024    EOSRELPCT 0.7 06/21/2024    BASORELPCT 0.5 06/21/2024    AUTOIGPER 0.2 06/21/2024    NEUTROABS 3.75 06/21/2024    LYMPHSABS 1.77 06/21/2024    MONOSABS 0.29 06/21/2024    EOSABS 0.04 06/21/2024    BASOSABS 0.03 06/21/2024    AUTOIGNUM 0.01 06/21/2024    NRBC 0.0 06/21/2024       Clinical Review  Patient reports adherence to oral iron and vitamin B12; refills have been sent to patient's preferred pharmacy per patient request and Dr. Valencia approval.     Educated patient to  separate the oral iron and levothyroxine by at least 4 hours, as iron preparations may decrease the serum concentration of Levothyroxine. Also discussed that oral iron may be administered with water or juice on an empty stomach to enhance absorption; if not tolerated it may be administered with food to prevent irritation; however, not with cereals, dietary fiber, tea, coffee, eggs, or milk.    Patient reports feeling well overall. Patient denies any symptoms of shortness of breath, fatigue, and/or light-headedness. Patient denies any signs/symptoms of bleeding or bruising. Patient denies any sign/symptoms of infection.     Platelets 124,000 from 117,000 (12/21/23)    The patient's current therapy plan and above labs have been reviewed.     Plan  CBC reviewed, results are stable for this patient at this time. - see above in Laboratory Results  Will instruct Kathleen Claire to continue oral iron every other day and vitamin B12 daily.  Follow up in 6 months when scheduled with Dr. Valencia. Next scheduled appointment(s) reviewed with patient.  Patient is instructed to call the office with any concerns or new symptoms prior to next visit.  Verbal information provided. Patient expresses understanding and has no further questions at this time.            Sonal Santos, PharmD  Clinical Pharmacy Services   [facility]  6/21/2024  14:10 EDT

## 2024-08-26 DIAGNOSIS — E03.9 ACQUIRED HYPOTHYROIDISM: ICD-10-CM

## 2024-08-27 RX ORDER — LEVOTHYROXINE SODIUM 50 UG/1
TABLET ORAL
Qty: 90 TABLET | Refills: 1 | Status: SHIPPED | OUTPATIENT
Start: 2024-08-27

## 2024-09-06 ENCOUNTER — OFFICE VISIT (OUTPATIENT)
Dept: INTERNAL MEDICINE | Facility: CLINIC | Age: 34
End: 2024-09-06
Payer: COMMERCIAL

## 2024-09-06 VITALS
DIASTOLIC BLOOD PRESSURE: 80 MMHG | SYSTOLIC BLOOD PRESSURE: 118 MMHG | HEART RATE: 78 BPM | BODY MASS INDEX: 30.76 KG/M2 | TEMPERATURE: 97.9 F | HEIGHT: 63 IN | OXYGEN SATURATION: 99 % | RESPIRATION RATE: 16 BRPM | WEIGHT: 173.6 LBS

## 2024-09-06 DIAGNOSIS — J01.00 ACUTE NON-RECURRENT MAXILLARY SINUSITIS: Primary | ICD-10-CM

## 2024-09-06 PROCEDURE — 99213 OFFICE O/P EST LOW 20 MIN: CPT | Performed by: FAMILY MEDICINE

## 2024-09-06 RX ORDER — MEDROXYPROGESTERONE ACETATE 10 MG
10 TABLET ORAL DAILY
COMMUNITY
Start: 2024-02-28 | End: 2025-02-27

## 2024-09-06 RX ORDER — AMOXICILLIN 500 MG/1
500 CAPSULE ORAL 3 TIMES DAILY
Qty: 30 CAPSULE | Refills: 0 | Status: SHIPPED | OUTPATIENT
Start: 2024-09-06

## 2024-09-06 NOTE — PROGRESS NOTES
"Chief Complaint  Sinusitis (Drainage x 3 days with fatigue)    Subjective        Kathleen Claire presents to [facility]  Sinusitis      Patient appointment for acute problem of head congestion nasal congestion tenderness with low-grade headache over the last 3 days  Objective   Vital Signs:  /80 (BP Location: Left arm, Patient Position: Sitting, Cuff Size: Adult)   Pulse 78   Temp 97.9 °F (36.6 °C) (Temporal)   Resp 16   Ht 160 cm (62.99\")   Wt 78.7 kg (173 lb 9.6 oz)   SpO2 99%   BMI 30.76 kg/m²   Estimated body mass index is 30.76 kg/m² as calculated from the following:    Height as of this encounter: 160 cm (62.99\").    Weight as of this encounter: 78.7 kg (173 lb 9.6 oz).          Physical Exam  Vitals and nursing note reviewed.   Constitutional:       Appearance: Normal appearance.   HENT:      Right Ear: Tympanic membrane, ear canal and external ear normal.      Left Ear: Tympanic membrane, ear canal and external ear normal.      Nose:      Comments: Sinus tenderness bilateral maxillary     Mouth/Throat:      Pharynx: No posterior oropharyngeal erythema.   Neurological:      Mental Status: She is alert.   Psychiatric:         Mood and Affect: Mood normal.         Behavior: Behavior normal.         Thought Content: Thought content normal.         Judgment: Judgment normal.        Result Review :                Assessment and Plan   Diagnoses and all orders for this visit:    1. Acute non-recurrent maxillary sinusitis (Primary)    Other orders  -     amoxicillin (AMOXIL) 500 MG capsule; Take 1 capsule by mouth 3 (Three) Times a Day.  Dispense: 30 capsule; Refill: 0             Follow Up   Return if symptoms worsen or fail to improve.  Patient was given instructions and counseling regarding her condition or for health maintenance advice. Please see specific information pulled into the AVS if appropriate.             "

## 2025-01-02 ENCOUNTER — OFFICE VISIT (OUTPATIENT)
Dept: ONCOLOGY | Facility: CLINIC | Age: 35
End: 2025-01-02
Payer: COMMERCIAL

## 2025-01-02 ENCOUNTER — LAB (OUTPATIENT)
Dept: OTHER | Facility: HOSPITAL | Age: 35
End: 2025-01-02
Payer: COMMERCIAL

## 2025-01-02 VITALS
DIASTOLIC BLOOD PRESSURE: 63 MMHG | BODY MASS INDEX: 30.88 KG/M2 | OXYGEN SATURATION: 96 % | HEIGHT: 63 IN | SYSTOLIC BLOOD PRESSURE: 116 MMHG | HEART RATE: 64 BPM | RESPIRATION RATE: 16 BRPM | TEMPERATURE: 99.1 F | WEIGHT: 174.3 LBS

## 2025-01-02 DIAGNOSIS — E03.9 ACQUIRED HYPOTHYROIDISM: ICD-10-CM

## 2025-01-02 DIAGNOSIS — R13.10 DYSPHAGIA, UNSPECIFIED TYPE: ICD-10-CM

## 2025-01-02 DIAGNOSIS — D69.6 THROMBOCYTOPENIA: ICD-10-CM

## 2025-01-02 DIAGNOSIS — D69.6 THROMBOCYTOPENIA: Primary | ICD-10-CM

## 2025-01-02 DIAGNOSIS — E53.8 B12 DEFICIENCY: ICD-10-CM

## 2025-01-02 LAB
BASOPHILS # BLD AUTO: 0.03 10*3/MM3 (ref 0–0.2)
BASOPHILS NFR BLD AUTO: 0.5 % (ref 0–1.5)
DEPRECATED RDW RBC AUTO: 40.6 FL (ref 37–54)
EOSINOPHIL # BLD AUTO: 0.05 10*3/MM3 (ref 0–0.4)
EOSINOPHIL NFR BLD AUTO: 0.8 % (ref 0.3–6.2)
ERYTHROCYTE [DISTWIDTH] IN BLOOD BY AUTOMATED COUNT: 12.9 % (ref 12.3–15.4)
FERRITIN SERPL-MCNC: 29.5 NG/ML (ref 13–150)
HCT VFR BLD AUTO: 38.9 % (ref 34–46.6)
HGB BLD-MCNC: 12.3 G/DL (ref 12–15.9)
IMM GRANULOCYTES # BLD AUTO: 0.09 10*3/MM3 (ref 0–0.05)
IMM GRANULOCYTES NFR BLD AUTO: 1.4 % (ref 0–0.5)
IRON 24H UR-MRATE: 78 MCG/DL (ref 37–145)
IRON SATN MFR SERPL: 17 % (ref 20–50)
LYMPHOCYTES # BLD AUTO: 1.77 10*3/MM3 (ref 0.7–3.1)
LYMPHOCYTES NFR BLD AUTO: 27.9 % (ref 19.6–45.3)
MCH RBC QN AUTO: 27.5 PG (ref 26.6–33)
MCHC RBC AUTO-ENTMCNC: 31.6 G/DL (ref 31.5–35.7)
MCV RBC AUTO: 87 FL (ref 79–97)
MONOCYTES # BLD AUTO: 0.43 10*3/MM3 (ref 0.1–0.9)
MONOCYTES NFR BLD AUTO: 6.8 % (ref 5–12)
NEUTROPHILS NFR BLD AUTO: 3.98 10*3/MM3 (ref 1.7–7)
NEUTROPHILS NFR BLD AUTO: 62.6 % (ref 42.7–76)
NRBC BLD AUTO-RTO: 0 /100 WBC (ref 0–0.2)
PLAT MORPH BLD: NORMAL
PLATELET # BLD AUTO: 123 10*3/MM3 (ref 140–450)
PMV BLD AUTO: 13.9 FL (ref 6–12)
RBC # BLD AUTO: 4.47 10*6/MM3 (ref 3.77–5.28)
RBC MORPH BLD: NORMAL
TIBC SERPL-MCNC: 471 MCG/DL (ref 298–536)
TRANSFERRIN SERPL-MCNC: 316 MG/DL (ref 200–360)
TSH SERPL DL<=0.05 MIU/L-ACNC: 2.01 UIU/ML (ref 0.27–4.2)
WBC MORPH BLD: NORMAL
WBC NRBC COR # BLD AUTO: 6.35 10*3/MM3 (ref 3.4–10.8)

## 2025-01-02 PROCEDURE — 85025 COMPLETE CBC W/AUTO DIFF WBC: CPT | Performed by: INTERNAL MEDICINE

## 2025-01-02 PROCEDURE — 36415 COLL VENOUS BLD VENIPUNCTURE: CPT

## 2025-01-02 PROCEDURE — 82728 ASSAY OF FERRITIN: CPT | Performed by: INTERNAL MEDICINE

## 2025-01-02 PROCEDURE — 84466 ASSAY OF TRANSFERRIN: CPT | Performed by: INTERNAL MEDICINE

## 2025-01-02 PROCEDURE — 85007 BL SMEAR W/DIFF WBC COUNT: CPT | Performed by: INTERNAL MEDICINE

## 2025-01-02 PROCEDURE — 83540 ASSAY OF IRON: CPT | Performed by: INTERNAL MEDICINE

## 2025-01-02 PROCEDURE — 84443 ASSAY THYROID STIM HORMONE: CPT | Performed by: INTERNAL MEDICINE

## 2025-01-02 NOTE — PROGRESS NOTES
Subjective   Kathleen Claire is a 34 y.o. female.  Referred by  for evaluation of thrombocytopenia.      History of Present Illness    is a 30-year-old premenopausal  lady with longstanding history of thrombocytopenia.  She was diagnosed with thrombocytopenia prior to having her first child about 8 years ago.  She reports being placed on steroids recently around the time of delivery of her second child about 9 months ago.  At that point she was evaluated by Dr. Nehemias Carmen at [facility] who recommended steroids to help increase the platelet count.  100,000 to facilitate epidural.  She moved to [place] about a year ago from [place] where she reportedly had seen a hematologist and was diagnosed with ITP.  She denies being on any medications chronically except for levothyroxine, denies any new herbal medications or herbal teas.  On review of the labs she does have B12 deficiency with a B12 level at 100.  She has been on oral B12 replacement however she is has not taken that since she got pregnant with her second child.  She has never been on parenteral B12 replacement.  She denies any excessive bleeding or bruising.  Denies any risk factors for HIV or hepatitis B.  Denies any history of alcohol use.  She reports a well-rounded diet.   Review of labs over the past year showed platelet counts ranging from ,000.Iron studies performed November 2019 suggestive of iron deficiency with ferritin of 7 and iron saturation 19.  She reports excessive fatigue.  Denies any recent weight changes, no fevers, no chills, no new lymphadenopathy.    Interval history  Kathleen returns today for follow-up.  She is reporting dysphagia, previously she has had dysphagia about 7 years ago requiring esophageal dilatation in [place].  She has not had a gastroenterology follow-up since moving to [place].  She reports that the dysphagia has been getting progressively worse over the past few months.  She also  "reports increased fatigue.  She has been somewhat noncompliant with iron as well as B12.    The following portions of the patient's history were reviewed and updated as appropriate: allergies, current medications, past family history, past medical history, past social history, past surgical history and problem list.    No past medical history on file.     Past Surgical History:   Procedure Laterality Date     SECTION          No family history on file.     Social History     Socioeconomic History    Marital status:    Tobacco Use    Smoking status: Never    Smokeless tobacco: Never   Vaping Use    Vaping status: Never Used   Substance and Sexual Activity    Alcohol use: Never    Drug use: Never    Sexual activity: Yes     Partners: Male     Birth control/protection: Condom        OB History    No obstetric history on file.          No Known Allergies       Review of systems as mentioned in the HPI otherwise negative    Objective   Blood pressure 116/63, pulse 64, temperature 99.1 °F (37.3 °C), temperature source Temporal, resp. rate 16, height 160 cm (62.99\"), weight 79.1 kg (174 lb 4.8 oz), SpO2 96%, not currently breastfeeding.     Physical Exam  Vitals reviewed.   Constitutional:       Appearance: Normal appearance. She is normal weight.   HENT:      Head: Normocephalic.      Nose: Nose normal.      Mouth/Throat:      Mouth: Mucous membranes are moist.      Pharynx: Oropharynx is clear.   Eyes:      Conjunctiva/sclera: Conjunctivae normal.      Pupils: Pupils are equal, round, and reactive to light.   Cardiovascular:      Rate and Rhythm: Normal rate and regular rhythm.      Heart sounds: Normal heart sounds. No murmur heard.     No gallop.   Pulmonary:      Effort: Pulmonary effort is normal.   Abdominal:      General: Abdomen is flat. Bowel sounds are normal.      Palpations: Abdomen is soft.   Musculoskeletal:         General: Normal range of motion.      Cervical back: Normal range of motion. " No muscular tenderness.   Skin:     General: Skin is warm and dry.      Findings: No rash.   Neurological:      General: No focal deficit present.      Mental Status: She is alert and oriented to person, place, and time. Mental status is at baseline.      Motor: No weakness.   Psychiatric:         Mood and Affect: Mood normal.         Behavior: Behavior normal.         Thought Content: Thought content normal.         Judgment: Judgment normal.     I have reexamined the patient and the results are consistent with the previously documented exam. Beatriz Valencia MD      Lab on 01/02/2025   Component Date Value Ref Range Status    Ferritin 01/02/2025 29.50  13.00 - 150.00 ng/mL Final    Iron 01/02/2025 78  37 - 145 mcg/dL Final    Iron Saturation (TSAT) 01/02/2025 17 (L)  20 - 50 % Final    Transferrin 01/02/2025 316  200 - 360 mg/dL Final    TIBC 01/02/2025 471  298 - 536 mcg/dL Final    WBC 01/02/2025 6.35  3.40 - 10.80 10*3/mm3 Final    RBC 01/02/2025 4.47  3.77 - 5.28 10*6/mm3 Final    Hemoglobin 01/02/2025 12.3  12.0 - 15.9 g/dL Final    Hematocrit 01/02/2025 38.9  34.0 - 46.6 % Final    MCV 01/02/2025 87.0  79.0 - 97.0 fL Final    MCH 01/02/2025 27.5  26.6 - 33.0 pg Final    MCHC 01/02/2025 31.6  31.5 - 35.7 g/dL Final    RDW 01/02/2025 12.9  12.3 - 15.4 % Final    RDW-SD 01/02/2025 40.6  37.0 - 54.0 fl Final    MPV 01/02/2025 13.9 (H)  6.0 - 12.0 fL Final    Platelets 01/02/2025 123 (L)  140 - 450 10*3/mm3 Final    Neutrophil % 01/02/2025 62.6  42.7 - 76.0 % Final    Lymphocyte % 01/02/2025 27.9  19.6 - 45.3 % Final    Monocyte % 01/02/2025 6.8  5.0 - 12.0 % Final    Eosinophil % 01/02/2025 0.8  0.3 - 6.2 % Final    Basophil % 01/02/2025 0.5  0.0 - 1.5 % Final    Immature Grans % 01/02/2025 1.4 (H)  0.0 - 0.5 % Final    Neutrophils, Absolute 01/02/2025 3.98  1.70 - 7.00 10*3/mm3 Final    Lymphocytes, Absolute 01/02/2025 1.77  0.70 - 3.10 10*3/mm3 Final    Monocytes, Absolute 01/02/2025 0.43  0.10 - 0.90 10*3/mm3  Final    Eosinophils, Absolute 01/02/2025 0.05  0.00 - 0.40 10*3/mm3 Final    Basophils, Absolute 01/02/2025 0.03  0.00 - 0.20 10*3/mm3 Final    Immature Grans, Absolute 01/02/2025 0.09 (H)  0.00 - 0.05 10*3/mm3 Final    nRBC 01/02/2025 0.0  0.0 - 0.2 /100 WBC Final    RBC Morphology 01/02/2025 Normal  Normal Final    WBC Morphology 01/02/2025 Normal  Normal Final    Platelet Morphology 01/02/2025 Normal  Normal Final        No radiology results for the last 30 days.     1/2/2025-CBC reviewed and WBC 6.35, hemoglobin 12.3 and platelets 123,000, absolute neutrophil count of 3.98  Ferritin 29.5  Iron saturation 17%, TIBC 471  TSH pending      Assessment & Plan   There are no diagnoses linked to this encounter.     is a 34 yr old premenopausal lady with long standing thrombocytopenia here for evaluation of the thrombocytoepnia    1.Thrombocytopenia  Secondary to ITP given elevated IPF  Platelet count today 111,000.  B12 levels normal on 12/11/2023, continue B12 supplementation  Platelets stable at 123,000    2.  B12 deficiency  She had been compliant with B12 previously however in the past few weeks she has been noncompliant with her oral B12.  She reports increased fatigue  B12 is normal on 2/11/2023  Continue B12 supplementation    3.iron deficiency  Hemoglobin 12.3  Iron saturation low at 17%, ferritin low at 29.5  Patient has been somewhat noncompliant with oral iron  Recommend resuming oral iron    4.fatigue  Did improve with B12 and iron supplementation however has been worse over the past 3 to 4 weeks due to viral illness  6/22/2023: Previously lost to follow-up, however rescheduled due to worsening fatigue over the last few weeks.  Hemoglobin normal at 12.2.  Iron labs pending.  Vitamin B12 level pending.  12/12/2023 TSH normal    5.hypothyroidism  Continue levothyroxine-following PCP.  TSH will be checked today    6.dysphagia  Previously required EGD and esophageal dilatation about 7 years ago  Referral  placed to gastroenterology today.    PLAN:   Continue oral vitamin B12.  Continue oral iron supplementation.  Return in 6 months     40 minutes total spent on the encounter on the same day    Beatriz Valencia MD  01/02/25

## 2025-01-03 ENCOUNTER — OFFICE VISIT (OUTPATIENT)
Dept: INTERNAL MEDICINE | Facility: CLINIC | Age: 35
End: 2025-01-03
Payer: COMMERCIAL

## 2025-01-03 VITALS
RESPIRATION RATE: 16 BRPM | OXYGEN SATURATION: 100 % | SYSTOLIC BLOOD PRESSURE: 118 MMHG | HEIGHT: 63 IN | TEMPERATURE: 96.5 F | HEART RATE: 65 BPM | DIASTOLIC BLOOD PRESSURE: 72 MMHG | WEIGHT: 174 LBS | BODY MASS INDEX: 30.83 KG/M2

## 2025-01-03 DIAGNOSIS — E55.9 VITAMIN D DEFICIENCY: ICD-10-CM

## 2025-01-03 DIAGNOSIS — R13.13 PHARYNGEAL DYSPHAGIA: ICD-10-CM

## 2025-01-03 DIAGNOSIS — Z00.00 HEALTHCARE MAINTENANCE: Primary | ICD-10-CM

## 2025-01-03 DIAGNOSIS — E03.9 ACQUIRED HYPOTHYROIDISM: ICD-10-CM

## 2025-01-03 PROCEDURE — 99213 OFFICE O/P EST LOW 20 MIN: CPT | Performed by: FAMILY MEDICINE

## 2025-01-03 RX ORDER — FLUTICASONE PROPIONATE 50 MCG
2 SPRAY, SUSPENSION (ML) NASAL
Qty: 11.1 G | Refills: 1 | Status: SHIPPED | OUTPATIENT
Start: 2025-01-03

## 2025-01-03 NOTE — PROGRESS NOTES
Subjective   Kathleen Claire is a 34 y.o. female.     Chief Complaint   Patient presents with    Annual Exam         History of Present Illness   Kathleen Claire 34 y.o. female who presents for an Annual Wellness Visit.  she has a history of   Patient Active Problem List   Diagnosis    Acquired hypothyroidism    Healthcare maintenance    Overweight (BMI 25.0-29.9)    Thrombocytopenia    Gastroesophageal reflux disease with esophagitis without hemorrhage    Neck pain on left side    Anemia due to vitamin B12 deficiency    Gastroesophageal reflux disease with esophagitis    Acute pharyngitis due to other specified organisms    Vitamin D deficiency    Chronic pain of right knee   .  she has been feeling well.   I  reviewed health maintenance with her as part of my preventative care plan.  Recommend regular dental and eye exams  The following portions of the patient's history were reviewed and updated as appropriate: allergies, current medications, past family history, past medical history, past social history, past surgical history, and problem list.    Review of Systems   Constitutional:  Negative for appetite change, fever and unexpected weight change.   HENT:  Positive for trouble swallowing. Negative for ear pain, facial swelling and sore throat.    Eyes:  Negative for pain and visual disturbance.   Respiratory:  Negative for cough, chest tightness, shortness of breath and wheezing.    Cardiovascular:  Negative for chest pain and palpitations.   Gastrointestinal:  Negative for abdominal pain, blood in stool and vomiting.   Endocrine: Negative.    Genitourinary:  Negative for difficulty urinating and hematuria.   Musculoskeletal:  Negative for joint swelling.   Neurological:  Negative for tremors, seizures and syncope.   Hematological:  Negative for adenopathy.   Psychiatric/Behavioral: Negative.         Objective   Physical Exam  Vitals and nursing note reviewed.   Constitutional:       Appearance: Normal appearance.  She is well-developed. She is not diaphoretic.   HENT:      Head: Normocephalic and atraumatic.      Right Ear: Tympanic membrane, ear canal and external ear normal.      Left Ear: Tympanic membrane, ear canal and external ear normal.      Nose: Congestion present. No rhinorrhea.      Mouth/Throat:      Pharynx: Posterior oropharyngeal erythema present.   Eyes:      General: Lids are normal. No scleral icterus.     Extraocular Movements: Extraocular movements intact.      Conjunctiva/sclera: Conjunctivae normal.   Neck:      Thyroid: No thyroid mass or thyromegaly.      Vascular: No carotid bruit or JVD.   Cardiovascular:      Rate and Rhythm: Normal rate and regular rhythm.      Pulses: Normal pulses.           Radial pulses are 2+ on the right side and 2+ on the left side.      Heart sounds: Normal heart sounds. No murmur heard.  Pulmonary:      Effort: Pulmonary effort is normal. No respiratory distress.      Breath sounds: Normal breath sounds.   Abdominal:      Tenderness: There is no right CVA tenderness.   Musculoskeletal:      Cervical back: Normal range of motion.      Right lower leg: No edema.      Left lower leg: No edema.   Lymphadenopathy:      Cervical: No cervical adenopathy.   Skin:     General: Skin is warm and dry.      Coloration: Skin is not pale.      Findings: No erythema or rash.   Neurological:      General: No focal deficit present.      Mental Status: She is alert and oriented to person, place, and time.      Sensory: No sensory deficit.      Deep Tendon Reflexes: Reflexes are normal and symmetric.   Psychiatric:         Mood and Affect: Mood normal.         Behavior: Behavior normal. Behavior is cooperative.         Thought Content: Thought content normal.         Judgment: Judgment normal.         Assessment & Plan   Diagnoses and all orders for this visit:    1. Healthcare maintenance (Primary)  -     Lipid Panel    2. Acquired hypothyroidism    3. Pharyngeal dysphagia  -     Ambulatory  Referral to ENT (Otolaryngology)  -     fluticasone (FLONASE) 50 MCG/ACT nasal spray; Administer 2 sprays into the nostril(s) as directed by provider every night at bedtime.  Dispense: 11.1 g; Refill: 1    4. Vitamin D deficiency  -     Vitamin D,25-Hydroxy      Continue attempts at healthy lifestyle calorie appropriate diet and regular physical activity  Education provided regarding prevention of serious illness with immunizations patient's current  Education provided regarding early detection screening recommend Pap smear  Follow-up ongoing management of chronic problems otherwise preventively annually

## 2025-01-03 NOTE — PROGRESS NOTES
"Chief Complaint  Difficulty Swallowing    Subjective        Kathleen Claire presents to [facility]  Difficulty Swallowing    Patient appointment to discuss difficulty swallowing she feels that most of this is happening in her throat she does not cough she does not feel any specific heartburn or chest discomfort or abdominal discomfort  Took some Prilosec to see if this would help and cannot tell much of a difference  She thinks she may have some allergies as she has some intermittent nasal congestion  No fever sweats or chills  She has known vitamin D deficiency presently taking 5000 units daily  Objective   Vital Signs:  /72   Pulse 65   Temp 96.5 °F (35.8 °C)   Resp 16   Ht 160 cm (62.99\")   Wt 78.9 kg (174 lb)   SpO2 100%   BMI 30.83 kg/m²   Estimated body mass index is 30.83 kg/m² as calculated from the following:    Height as of this encounter: 160 cm (62.99\").    Weight as of this encounter: 78.9 kg (174 lb).            Physical Exam  Vitals and nursing note reviewed.   Constitutional:       Appearance: Normal appearance. She is well-developed. She is not diaphoretic.   HENT:      Head: Normocephalic and atraumatic.      Right Ear: Tympanic membrane, ear canal and external ear normal.      Left Ear: Tympanic membrane, ear canal and external ear normal.      Nose: Congestion present.      Mouth/Throat:      Pharynx: Posterior oropharyngeal erythema present.   Eyes:      General: Lids are normal. No scleral icterus.     Extraocular Movements: Extraocular movements intact.      Conjunctiva/sclera: Conjunctivae normal.   Neck:      Thyroid: No thyroid mass or thyromegaly.      Vascular: No carotid bruit or JVD.   Cardiovascular:      Rate and Rhythm: Normal rate and regular rhythm.      Pulses:           Radial pulses are 2+ on the right side and 2+ on the left side.      Heart sounds: Normal heart sounds. No murmur heard.  Pulmonary:      Effort: Pulmonary effort is " normal. No respiratory distress.      Breath sounds: Normal breath sounds.   Abdominal:      Tenderness: There is no right CVA tenderness or left CVA tenderness.   Musculoskeletal:      Cervical back: Normal range of motion. No tenderness.      Right lower leg: No edema.      Left lower leg: No edema.   Lymphadenopathy:      Cervical: No cervical adenopathy.   Skin:     General: Skin is warm and dry.      Coloration: Skin is not pale.      Findings: No erythema or rash.   Neurological:      General: No focal deficit present.      Mental Status: She is alert and oriented to person, place, and time.      Sensory: No sensory deficit.      Deep Tendon Reflexes: Reflexes are normal and symmetric.   Psychiatric:         Mood and Affect: Mood normal.         Behavior: Behavior normal. Behavior is cooperative.         Thought Content: Thought content normal.         Judgment: Judgment normal.        Result Review :    Common labs          6/21/2024    14:24 1/2/2025    13:39   Common Labs   WBC 5.89  6.35    Hemoglobin 12.2  12.3    Hematocrit 38.4  38.9    Platelets 124  123                Assessment and Plan   Diagnoses and all orders for this visit:            1. Pharyngeal dysphagia  -     Ambulatory Referral to ENT (Otolaryngology)  -     fluticasone (FLONASE) 50 MCG/ACT nasal spray; Administer 2 sprays into the nostril(s) as directed by provider every night at bedtime.  Dispense: 11.1 g; Refill: 1    2. Vitamin D deficiency  -     Vitamin D,25-Hydroxy    Initiate Flonase therapy for rhinitis   Consult ENT for difficulty swallowing   Patient is also having consultation with GI for suspicion of reflux as ordered by hematology         Follow Up   Return in about 3 months (around 4/3/2025), or if symptoms worsen or fail to improve, for Recheck.  Patient was given instructions and counseling regarding her condition or for health maintenance advice. Please see specific information pulled into the AVS if appropriate.

## 2025-01-04 LAB
25(OH)D3+25(OH)D2 SERPL-MCNC: 14.9 NG/ML (ref 30–100)
CHOLEST SERPL-MCNC: 178 MG/DL (ref 0–200)
HDLC SERPL-MCNC: 41 MG/DL (ref 40–60)
LDLC SERPL CALC-MCNC: 107 MG/DL (ref 0–100)
TRIGL SERPL-MCNC: 173 MG/DL (ref 0–150)
VLDLC SERPL CALC-MCNC: 30 MG/DL (ref 5–40)

## 2025-01-15 ENCOUNTER — TRANSCRIBE ORDERS (OUTPATIENT)
Dept: ADMINISTRATIVE | Facility: HOSPITAL | Age: 35
End: 2025-01-15
Payer: COMMERCIAL

## 2025-01-15 DIAGNOSIS — R13.14 DYSPHAGIA, PHARYNGOESOPHAGEAL PHASE: Primary | ICD-10-CM

## 2025-01-31 ENCOUNTER — HOSPITAL ENCOUNTER (OUTPATIENT)
Dept: GENERAL RADIOLOGY | Facility: HOSPITAL | Age: 35
Discharge: HOME | End: 2025-01-31
Admitting: OTOLARYNGOLOGY
Payer: COMMERCIAL

## 2025-01-31 DIAGNOSIS — R13.14 DYSPHAGIA, PHARYNGOESOPHAGEAL PHASE: ICD-10-CM

## 2025-01-31 PROCEDURE — 74221 X-RAY XM ESOPHAGUS 2CNTRST: CPT

## 2025-01-31 RX ADMIN — BARIUM SULFATE 700 MG: 700 TABLET ORAL at 12:02

## 2025-01-31 RX ADMIN — BARIUM SULFATE 135 ML: 980 POWDER, FOR SUSPENSION ORAL at 12:02

## 2025-01-31 RX ADMIN — ANTACID/ANTIFLATULENT 1 PACKET: 380; 550; 10; 10 GRANULE, EFFERVESCENT ORAL at 12:02

## 2025-01-31 RX ADMIN — BARIUM SULFATE 183 ML: 960 POWDER, FOR SUSPENSION ORAL at 12:02

## 2025-02-10 DIAGNOSIS — R13.13 PHARYNGEAL DYSPHAGIA: ICD-10-CM

## 2025-02-10 RX ORDER — FLUTICASONE PROPIONATE 50 MCG
2 SPRAY, SUSPENSION (ML) NASAL
Qty: 11.1 G | Refills: 1 | Status: SHIPPED | OUTPATIENT
Start: 2025-02-10

## 2025-03-03 DIAGNOSIS — E03.9 ACQUIRED HYPOTHYROIDISM: ICD-10-CM

## 2025-03-05 RX ORDER — LEVOTHYROXINE SODIUM 50 UG/1
TABLET ORAL
Qty: 90 TABLET | Refills: 1 | Status: SHIPPED | OUTPATIENT
Start: 2025-03-05

## 2025-03-10 NOTE — H&P (VIEW-ONLY)
Chief Complaint  Heartburn and Difficulty Swallowing    Subjective        Kathleen Dakotah     History of Present Illness  The patient is a 34-year-old female who presents today as a new patient.    She has a past medical history of iron deficiency anemia, GERD, thrombocytopenia, hypothyroidism, and vitamin D deficiency. She is under the care of Dr. Valencia for her hematological conditions. She has a longstanding history of thrombocytopenia, likely secondary to ITP, given her elevated IPF. Her platelet count was 111,000 when she visited Dr. Valencia on 01/02/2025 and it remains stable around 123,000. Her B12 levels are normal, and she continues to take B12 supplements. Her hemoglobin is around 12 g/dL. Her iron saturation is low at 17, and her ferritin is low at 29.5. She takes oral iron supplements on alternate days.    - She presents today for a chief complaint of dysphagia. She underwent an EGD with esophageal dilation on 01/31/2018, which provided immediate relief.However, her symptoms have recurred since 01/2025.    - She underwent an esophagram study on 01/31/2025, which revealed moderate acid reflux. Despite this, she continues to experience symptoms. An ENT evaluation, unrevealing findings. No ENT report available for review.     - Patient has been on 40mg of omeprazole, which she takes daily in the morning. However, this medication has not alleviated her symptoms. She reports difficulty burping, particularly when lying down, which results in a sensation of pressure in her chest. Prolonged retention of this pressure leads to tension headaches. She experiences dysphagia, particularly with solid foods, and requires water to aid swallowing. She also reports difficulty drinking water continuously, needing to take small sips instead.     - She reports no nausea, vomiting, abdominal pain, or changes in stool consistency or color. She has been actively trying to lose weight through diet and exercise for over a month.  "She reports poor sleep quality due to the aforementioned symptoms. She has attempted to alleviate her symptoms by adjusting her pillow height, but this has not been effective.      FAMILY HISTORY  She reports no family history of colon cancer, inflammatory bowel disease, or polyps.    MEDICATIONS  Current: B12 supplement, omeprazole       Results Reviewed:  FL ESOPHAGRAM DOUBLE CONTRAST (01/31/2025 11:57)   - Small to moderate volume of recumbent gastroesophageal reflux observed  refluxing to the upper thoracic esophagus.  -Could consider speech pathology consultation and/or further evaluation with endoscopy if clinically indicated    1/3/2025  -Vitamin D 14.9  -Lipid panel (triglyceride 173, HDL 41, )    6/21/2024  -CBC (hemoglobin 12.2)    Objective   Vital Signs:  /70   Pulse 69   Temp 97.9 °F (36.6 °C)   Ht 160 cm (63\")   Wt 76.9 kg (169 lb 9.6 oz)   SpO2 99%   BMI 30.04 kg/m²   Estimated body mass index is 30.04 kg/m² as calculated from the following:    Height as of this encounter: 160 cm (63\").    Weight as of this encounter: 76.9 kg (169 lb 9.6 oz).       Physical Exam  Vitals and nursing note reviewed.   Constitutional:       General: She is not in acute distress.     Appearance: Normal appearance. She is normal weight. She is not ill-appearing, toxic-appearing or diaphoretic.   Eyes:      General: No scleral icterus.     Conjunctiva/sclera: Conjunctivae normal.   Pulmonary:      Effort: Pulmonary effort is normal.   Abdominal:      General: Abdomen is flat. Bowel sounds are normal. There is no distension.      Palpations: Abdomen is soft. There is no mass.      Tenderness: There is no abdominal tenderness. There is no right CVA tenderness, left CVA tenderness, guarding or rebound.      Hernia: No hernia is present.   Skin:     Coloration: Skin is not jaundiced or pale.      Findings: No erythema or rash.   Neurological:      Mental Status: She is alert and oriented to person, place, and " time. Mental status is at baseline.   Psychiatric:         Mood and Affect: Mood normal.             Assessment and Plan     Diagnoses and all orders for this visit:    1. Gastroesophageal reflux disease, unspecified whether esophagitis present (Primary)    2. Esophageal dysphagia    3. Acid indigestion    4. Iron deficiency anemia, unspecified iron deficiency anemia type    5. Chronic ITP (idiopathic thrombocytopenia)      Assessment & Plan  Dysphaghia, solid>liquid  History of esophageal dialatation, 2018  GERD  Indigestion    - She reports persistent symptoms despite taking omeprazole daily in the morning. An esophagram study on January 31, 2025, showed moderate acid reflux. An EGD will be scheduled to further evaluate her condition, she is agreeable.   - Continue PPI, increase to bid x8 weeks, then qd.   - Antiacid reflux precautions discussed.     Iron Deficiency Anemia.  Thrombocytopenia    - Her iron saturation is low at 17, and her ferritin is low at 29.5. She is currently taking oral iron supplements on alternate days.   - She has a longstanding history of thrombocytopenia, likely secondary to ITP, with a stable platelet count around 123,000. She is under the care of Dr. Valencia for this condition. No changes in her current management plan are necessary at this time.  - Hgb remains around 12s g/dL. No overt s/s of bleeding.    PROCEDURE  The patient underwent an EGD with esophageal dilation on 01/31/2018, which provided immediate relief.    Follow up with me after EGD procedure    I have reviewed patient's current medications list, relevant clinical information necessary for today's encounter. I discussed the clinical impression and treatment plan with the patient, who verbalized understanding and in agreement.  All questions were answered and support was provided.    EMR Dragon/Transcription Disclaimer:  This document has been Dictated utilizing Dragon dictation.     Patient or patient representative  verbalized consent for the use of Ambient Listening during the visit with  CHELSIE Osborn for chart documentation. 3/13/2025  08:03 EDT    There are no Patient Instructions on file for this visit.

## 2025-03-13 ENCOUNTER — PREP FOR SURGERY (OUTPATIENT)
Dept: SURGERY | Facility: SURGERY CENTER | Age: 35
End: 2025-03-13
Payer: COMMERCIAL

## 2025-03-13 ENCOUNTER — OFFICE VISIT (OUTPATIENT)
Dept: GASTROENTEROLOGY | Facility: CLINIC | Age: 35
End: 2025-03-13
Payer: COMMERCIAL

## 2025-03-13 VITALS
OXYGEN SATURATION: 99 % | DIASTOLIC BLOOD PRESSURE: 70 MMHG | HEIGHT: 63 IN | BODY MASS INDEX: 30.05 KG/M2 | WEIGHT: 169.6 LBS | TEMPERATURE: 97.9 F | SYSTOLIC BLOOD PRESSURE: 120 MMHG | HEART RATE: 69 BPM

## 2025-03-13 DIAGNOSIS — R13.19 ESOPHAGEAL DYSPHAGIA: Primary | ICD-10-CM

## 2025-03-13 DIAGNOSIS — K30 ACID INDIGESTION: ICD-10-CM

## 2025-03-13 DIAGNOSIS — D50.9 IRON DEFICIENCY ANEMIA, UNSPECIFIED IRON DEFICIENCY ANEMIA TYPE: ICD-10-CM

## 2025-03-13 DIAGNOSIS — K21.9 GASTROESOPHAGEAL REFLUX DISEASE, UNSPECIFIED WHETHER ESOPHAGITIS PRESENT: ICD-10-CM

## 2025-03-13 DIAGNOSIS — D69.3 CHRONIC ITP (IDIOPATHIC THROMBOCYTOPENIA): ICD-10-CM

## 2025-03-13 DIAGNOSIS — K21.9 GASTROESOPHAGEAL REFLUX DISEASE, UNSPECIFIED WHETHER ESOPHAGITIS PRESENT: Primary | ICD-10-CM

## 2025-03-13 DIAGNOSIS — R13.19 ESOPHAGEAL DYSPHAGIA: ICD-10-CM

## 2025-03-13 RX ORDER — SODIUM CHLORIDE, SODIUM LACTATE, POTASSIUM CHLORIDE, CALCIUM CHLORIDE 600; 310; 30; 20 MG/100ML; MG/100ML; MG/100ML; MG/100ML
30 INJECTION, SOLUTION INTRAVENOUS ONCE
OUTPATIENT
Start: 2025-03-13

## 2025-03-13 RX ORDER — SODIUM CHLORIDE 0.9 % (FLUSH) 0.9 %
3 SYRINGE (ML) INJECTION EVERY 12 HOURS SCHEDULED
OUTPATIENT
Start: 2025-03-13

## 2025-03-13 RX ORDER — SODIUM CHLORIDE 0.9 % (FLUSH) 0.9 %
10 SYRINGE (ML) INJECTION AS NEEDED
OUTPATIENT
Start: 2025-03-13

## 2025-03-13 RX ORDER — OMEPRAZOLE 40 MG/1
1 CAPSULE, DELAYED RELEASE ORAL DAILY
COMMUNITY
Start: 2025-03-03

## 2025-03-13 NOTE — PATIENT INSTRUCTIONS
I recommend that we proceed with esophagoduodenoscopy (EGD)/upper endoscopy further evaluation, assess for any evidence of hiatal hernia, esophagitis, Abla's esophagus, eosinophilic esophagitis, or H. pylori infection that could be contributing to the presented symptoms.  Although EGD is generally a low risk procedure, but as with any invasive procedure, potential complications may occur during or after the procedure.   Continue Omeprazole 40mg daily.   GERD is a chronic disease that occurs when stomach acid flows back into the tube that connects your mouth and stomach. Warning of worsening symptoms may include: Hoarseness, trouble swallowing, too much throat mucus, a lump in the throat, chronic cough, and heartburn. Some conservative measures or treatment may help, which include:  Diet and lifestyle modification: avoid greasy foods or any foods that will cause heartburn for example chocolate, mint, spicy foods, tomato based products, and citrus. Avoid alcohol, tobacco, and caffeine. Avoid late night heavy meals. Avoid bending forward or stooping after eating. Sleep with head of your bed raised 6-8 inches.  You may also try Apple Cider Vinegar, 2 teaspoons in 8 ounces of water 3 times a day. Take 30 minutes before meals or after meals and rinse mouth after each use.   If difficulty swallowing occur, I recommend other supportive care measures, including thicken liquids to avoid aspiration. Eat smaller meals at different time intervals and cut into smaller pieces, take sips of water in between. If the above symptoms do not improve, please contact our office for further evaluation or as plan has discussed.   Be aware that potential risks associate with long-term (>6 months) PPI therapy may include but not limited to vitamin deficiencies or alteration in vitamin metabolism, such as iron, b12, d, calcium, and magnesium, as well as risks for gastric polyps and C. Difficile. Take additional vitamin supplements as we've  discussed today.

## 2025-03-27 ENCOUNTER — HOSPITAL ENCOUNTER (OUTPATIENT)
Facility: SURGERY CENTER | Age: 35
Setting detail: HOSPITAL OUTPATIENT SURGERY
Discharge: HOME | End: 2025-03-27
Attending: INTERNAL MEDICINE | Admitting: INTERNAL MEDICINE
Payer: COMMERCIAL

## 2025-03-27 ENCOUNTER — ANESTHESIA EVENT (OUTPATIENT)
Dept: SURGERY | Facility: SURGERY CENTER | Age: 35
End: 2025-03-27
Payer: COMMERCIAL

## 2025-03-27 ENCOUNTER — ANESTHESIA (OUTPATIENT)
Dept: SURGERY | Facility: SURGERY CENTER | Age: 35
End: 2025-03-27
Payer: COMMERCIAL

## 2025-03-27 VITALS
BODY MASS INDEX: 29.95 KG/M2 | HEIGHT: 63 IN | OXYGEN SATURATION: 97 % | RESPIRATION RATE: 18 BRPM | TEMPERATURE: 98.4 F | HEART RATE: 72 BPM | DIASTOLIC BLOOD PRESSURE: 68 MMHG | WEIGHT: 169 LBS | SYSTOLIC BLOOD PRESSURE: 111 MMHG

## 2025-03-27 DIAGNOSIS — K21.9 GASTROESOPHAGEAL REFLUX DISEASE, UNSPECIFIED WHETHER ESOPHAGITIS PRESENT: ICD-10-CM

## 2025-03-27 DIAGNOSIS — D50.9 IRON DEFICIENCY ANEMIA, UNSPECIFIED IRON DEFICIENCY ANEMIA TYPE: ICD-10-CM

## 2025-03-27 DIAGNOSIS — R13.19 ESOPHAGEAL DYSPHAGIA: ICD-10-CM

## 2025-03-27 LAB
B-HCG UR QL: NEGATIVE
EXPIRATION DATE: NORMAL
INTERNAL NEGATIVE CONTROL: NORMAL
INTERNAL POSITIVE CONTROL: NORMAL
Lab: NORMAL

## 2025-03-27 PROCEDURE — 25010000002 LIDOCAINE 2% SOLUTION: Performed by: ANESTHESIOLOGY

## 2025-03-27 PROCEDURE — 81025 URINE PREGNANCY TEST: CPT | Performed by: INTERNAL MEDICINE

## 2025-03-27 PROCEDURE — 25810000003 LACTATED RINGERS PER 1000 ML: Performed by: NURSE PRACTITIONER

## 2025-03-27 PROCEDURE — 88305 TISSUE EXAM BY PATHOLOGIST: CPT | Performed by: INTERNAL MEDICINE

## 2025-03-27 PROCEDURE — 25810000003 LACTATED RINGERS PER 1000 ML: Performed by: ANESTHESIOLOGY

## 2025-03-27 PROCEDURE — 43239 EGD BIOPSY SINGLE/MULTIPLE: CPT | Performed by: INTERNAL MEDICINE

## 2025-03-27 PROCEDURE — 25010000002 PROPOFOL 10 MG/ML EMULSION: Performed by: ANESTHESIOLOGY

## 2025-03-27 PROCEDURE — 43249 ESOPH EGD DILATION <30 MM: CPT | Performed by: INTERNAL MEDICINE

## 2025-03-27 PROCEDURE — C1726 CATH, BAL DIL, NON-VASCULAR: HCPCS | Performed by: INTERNAL MEDICINE

## 2025-03-27 RX ORDER — SODIUM CHLORIDE 0.9 % (FLUSH) 0.9 %
10 SYRINGE (ML) INJECTION AS NEEDED
Status: DISCONTINUED | OUTPATIENT
Start: 2025-03-27 | End: 2025-03-27 | Stop reason: HOSPADM

## 2025-03-27 RX ORDER — SODIUM CHLORIDE 0.9 % (FLUSH) 0.9 %
3 SYRINGE (ML) INJECTION EVERY 12 HOURS SCHEDULED
Status: DISCONTINUED | OUTPATIENT
Start: 2025-03-27 | End: 2025-03-27 | Stop reason: HOSPADM

## 2025-03-27 RX ORDER — LIDOCAINE HYDROCHLORIDE 20 MG/ML
INJECTION, SOLUTION INFILTRATION; PERINEURAL AS NEEDED
Status: DISCONTINUED | OUTPATIENT
Start: 2025-03-27 | End: 2025-03-27 | Stop reason: SURG

## 2025-03-27 RX ORDER — SODIUM CHLORIDE, SODIUM LACTATE, POTASSIUM CHLORIDE, CALCIUM CHLORIDE 600; 310; 30; 20 MG/100ML; MG/100ML; MG/100ML; MG/100ML
INJECTION, SOLUTION INTRAVENOUS CONTINUOUS PRN
Status: DISCONTINUED | OUTPATIENT
Start: 2025-03-27 | End: 2025-03-27 | Stop reason: SURG

## 2025-03-27 RX ORDER — SODIUM CHLORIDE, SODIUM LACTATE, POTASSIUM CHLORIDE, CALCIUM CHLORIDE 600; 310; 30; 20 MG/100ML; MG/100ML; MG/100ML; MG/100ML
30 INJECTION, SOLUTION INTRAVENOUS ONCE
Status: COMPLETED | OUTPATIENT
Start: 2025-03-27 | End: 2025-03-27

## 2025-03-27 RX ORDER — PROPOFOL 10 MG/ML
VIAL (ML) INTRAVENOUS AS NEEDED
Status: DISCONTINUED | OUTPATIENT
Start: 2025-03-27 | End: 2025-03-27 | Stop reason: SURG

## 2025-03-27 RX ADMIN — SODIUM CHLORIDE, POTASSIUM CHLORIDE, SODIUM LACTATE AND CALCIUM CHLORIDE: 600; 310; 30; 20 INJECTION, SOLUTION INTRAVENOUS at 09:10

## 2025-03-27 RX ADMIN — PROPOFOL 150 MG: 10 INJECTION, EMULSION INTRAVENOUS at 09:14

## 2025-03-27 RX ADMIN — LIDOCAINE HYDROCHLORIDE 60 MG: 20 INJECTION, SOLUTION INFILTRATION; PERINEURAL at 09:14

## 2025-03-27 RX ADMIN — PROPOFOL 200 MCG/KG/MIN: 10 INJECTION, EMULSION INTRAVENOUS at 09:14

## 2025-03-27 RX ADMIN — SODIUM CHLORIDE, SODIUM LACTATE, POTASSIUM CHLORIDE, CALCIUM CHLORIDE 30 ML/HR: 20; 30; 600; 310 INJECTION, SOLUTION INTRAVENOUS at 08:46

## 2025-03-27 NOTE — ANESTHESIA PREPROCEDURE EVALUATION
Anesthesia Evaluation     Patient summary reviewed and Nursing notes reviewed   NPO Solid Status: > 8 hours  NPO Liquid Status: > 2 hours           Airway   Mallampati: II  TM distance: >3 FB  Neck ROM: full  No difficulty expected  Dental - normal exam     Pulmonary - normal exam    breath sounds clear to auscultation  Cardiovascular - normal exam    Rhythm: regular  Rate: normal        Neuro/Psych  GI/Hepatic/Renal/Endo    (+) GERD, thyroid problem hypothyroidism    ROS Comment: Dysphagia    Musculoskeletal     (+) neck pain  Abdominal   (+) obese   Substance History      OB/GYN          Other                          Anesthesia Plan    ASA 2     MAC     intravenous induction     Anesthetic plan, risks, benefits, and alternatives have been provided, discussed and informed consent has been obtained with: patient.        CODE STATUS:

## 2025-03-27 NOTE — ANESTHESIA POSTPROCEDURE EVALUATION
Patient: Kathleen Claire    Procedure Summary       Date: 03/27/25 Room / Location: [facility]  / [facility]    Anesthesia Start: 0910 Anesthesia Stop: 0926    Procedure: ESOPHAGOGASTRODUODENOSCOPY with BXs & BALLOON DILATION 18-20mm Diagnosis:       Esophageal dysphagia      Gastroesophageal reflux disease, unspecified whether esophagitis present      Iron deficiency anemia, unspecified iron deficiency anemia type      (Esophageal dysphagia [R13.19])      (Gastroesophageal reflux disease, unspecified whether esophagitis present [K21.9])      (Iron deficiency anemia, unspecified iron deficiency anemia type [D50.9])    Surgeons: Mahesh Plata MD Provider: Anthony Clifford MD    Anesthesia Type: MAC ASA Status: 2            Anesthesia Type: MAC    Vitals  Vitals Value Taken Time   /68 03/27/25 09:45   Temp 36.9 °C (98.4 °F) 03/27/25 09:25   Pulse 64 03/27/25 09:46   Resp 18 03/27/25 09:45   SpO2 97 % 03/27/25 09:46   Vitals shown include unfiled device data.        Post Anesthesia Care and Evaluation    Patient location during evaluation: PHASE II  Patient participation: complete - patient participated  Level of consciousness: awake and alert  Pain management: adequate    Airway patency: patent  Anesthetic complications: No anesthetic complications  PONV Status: none  Cardiovascular status: acceptable and hemodynamically stable  Respiratory status: acceptable, nonlabored ventilation and spontaneous ventilation  Hydration status: acceptable

## 2025-03-28 LAB
CYTO UR: NORMAL
LAB AP CASE REPORT: NORMAL
PATH REPORT.FINAL DX SPEC: NORMAL
PATH REPORT.GROSS SPEC: NORMAL

## 2025-04-15 ENCOUNTER — TELEPHONE (OUTPATIENT)
Dept: GASTROENTEROLOGY | Facility: CLINIC | Age: 35
End: 2025-04-15
Payer: COMMERCIAL

## 2025-04-15 NOTE — TELEPHONE ENCOUNTER
Returned patient's call. She states that she was calling for a follow up appt with the nurse practitioner, not to discuss results; results were already discussed on 4/8/25. Patient is requesting to be seen before she travels to [facility] in May. Will check for available appts and follow up with patient. van

## 2025-04-15 NOTE — TELEPHONE ENCOUNTER
Caller: Kathleen Claire    Relationship: Self    Best call back number: [phone]    Caller requesting test results: PT    What test was performed: EGD    When was the test performed: 03/27/25    Where was the test performed: [facility].    Additional notes: PT GOING TO [place] IN MAY.  WOULD LIKE TO DISCUSS RESULTS OF THE EGD.  HUB I COULD NOT FIND AN OPEN APPT FOR F/U UNTIL LATE JUNE.   PLEASE CALL TO DISCUSS RESULTS.

## 2025-08-07 DIAGNOSIS — E03.9 ACQUIRED HYPOTHYROIDISM: ICD-10-CM

## 2025-08-07 RX ORDER — LEVOTHYROXINE SODIUM 50 UG/1
50 TABLET ORAL DAILY
Qty: 90 TABLET | Refills: 1 | Status: SHIPPED | OUTPATIENT
Start: 2025-08-07

## (undated) DEVICE — SINGLE-USE BIOPSY FORCEPS: Brand: RADIAL JAW 4

## (undated) DEVICE — GOWN,SIRUS,NONRNF,3XL,18/CS: Brand: MEDLINE

## (undated) DEVICE — KT ORCA ORCAPOD DISP STRL

## (undated) DEVICE — VIAL FORMLN CAP 10PCT 20ML

## (undated) DEVICE — MSK ENDO PORT O2 POM ELITE CURAPLEX A/

## (undated) DEVICE — ESOPHAGEAL BALLOON DILATATION CATHETER: Brand: CRE FIXED WIRE

## (undated) DEVICE — BLCK/BITE BLOX W/DENTL/RIM W/STRAP 54F

## (undated) DEVICE — ADAPT CLN SCPE ENDO PORPOISE BX/50 DISP

## (undated) DEVICE — DEV INFL ALLIANCE2 SYS

## (undated) DEVICE — Device